# Patient Record
Sex: FEMALE | Race: BLACK OR AFRICAN AMERICAN | Employment: FULL TIME | URBAN - METROPOLITAN AREA
[De-identification: names, ages, dates, MRNs, and addresses within clinical notes are randomized per-mention and may not be internally consistent; named-entity substitution may affect disease eponyms.]

---

## 2017-04-07 LAB
ANTIBODY SCREEN, EXTERNAL: NEGATIVE
CHLAMYDIA, EXTERNAL: NEGATIVE
HBSAG, EXTERNAL: NON REACTIVE
HIV, EXTERNAL: NEGATIVE
N. GONORRHEA, EXTERNAL: NEGATIVE
RUBELLA, EXTERNAL: NORMAL
T. PALLIDUM, EXTERNAL: NEGATIVE
TYPE, ABO & RH, EXTERNAL: NORMAL

## 2017-05-05 LAB
GRBS, EXTERNAL: POSITIVE
URINALYSIS, EXTERNAL: NORMAL

## 2017-11-06 ENCOUNTER — ANESTHESIA EVENT (OUTPATIENT)
Dept: LABOR AND DELIVERY | Age: 32
End: 2017-11-06
Payer: COMMERCIAL

## 2017-11-06 ENCOUNTER — ANESTHESIA (OUTPATIENT)
Dept: LABOR AND DELIVERY | Age: 32
End: 2017-11-06
Payer: COMMERCIAL

## 2017-11-06 ENCOUNTER — HOSPITAL ENCOUNTER (INPATIENT)
Age: 32
LOS: 2 days | Discharge: HOME | End: 2017-11-08
Attending: OBSTETRICS & GYNECOLOGY | Admitting: OBSTETRICS & GYNECOLOGY
Payer: COMMERCIAL

## 2017-11-06 PROBLEM — R10.9 ABDOMINAL PAIN DURING PREGNANCY IN THIRD TRIMESTER: Status: ACTIVE | Noted: 2017-11-06

## 2017-11-06 PROBLEM — O26.893 ABDOMINAL PAIN DURING PREGNANCY IN THIRD TRIMESTER: Status: ACTIVE | Noted: 2017-11-06

## 2017-11-06 LAB
ERYTHROCYTE [DISTWIDTH] IN BLOOD BY AUTOMATED COUNT: 13.9 % (ref 11.5–14.5)
HCT VFR BLD AUTO: 34.2 % (ref 35–47)
HGB BLD-MCNC: 11.7 G/DL (ref 11.5–16)
MCH RBC QN AUTO: 30.5 PG (ref 26–34)
MCHC RBC AUTO-ENTMCNC: 34.2 G/DL (ref 30–36.5)
MCV RBC AUTO: 89.3 FL (ref 80–99)
PLATELET # BLD AUTO: 185 K/UL (ref 150–400)
RBC # BLD AUTO: 3.83 M/UL (ref 3.8–5.2)
WBC # BLD AUTO: 9.8 K/UL (ref 3.6–11)

## 2017-11-06 PROCEDURE — 76060000078 HC EPIDURAL ANESTHESIA

## 2017-11-06 PROCEDURE — 77030031139 HC SUT VCRL2 J&J -A

## 2017-11-06 PROCEDURE — 74011250636 HC RX REV CODE- 250/636: Performed by: ANESTHESIOLOGY

## 2017-11-06 PROCEDURE — 75410000002 HC LABOR FEE PER 1 HR

## 2017-11-06 PROCEDURE — 65410000002 HC RM PRIVATE OB

## 2017-11-06 PROCEDURE — 77030011943

## 2017-11-06 PROCEDURE — 36415 COLL VENOUS BLD VENIPUNCTURE: CPT | Performed by: OBSTETRICS & GYNECOLOGY

## 2017-11-06 PROCEDURE — 74011000250 HC RX REV CODE- 250

## 2017-11-06 PROCEDURE — 77030007880 HC KT SPN EPDRL BBMI -B

## 2017-11-06 PROCEDURE — 75410000000 HC DELIVERY VAGINAL/SINGLE

## 2017-11-06 PROCEDURE — A4300 CATH IMPL VASC ACCESS PORTAL: HCPCS

## 2017-11-06 PROCEDURE — 74011250636 HC RX REV CODE- 250/636

## 2017-11-06 PROCEDURE — 0KQM0ZZ REPAIR PERINEUM MUSCLE, OPEN APPROACH: ICD-10-PCS | Performed by: OBSTETRICS & GYNECOLOGY

## 2017-11-06 PROCEDURE — 74011000258 HC RX REV CODE- 258: Performed by: OBSTETRICS & GYNECOLOGY

## 2017-11-06 PROCEDURE — 85027 COMPLETE CBC AUTOMATED: CPT | Performed by: OBSTETRICS & GYNECOLOGY

## 2017-11-06 PROCEDURE — 75410000003 HC RECOV DEL/VAG/CSECN EA 0.5 HR

## 2017-11-06 PROCEDURE — 99283 EMERGENCY DEPT VISIT LOW MDM: CPT

## 2017-11-06 PROCEDURE — 10907ZC DRAINAGE OF AMNIOTIC FLUID, THERAPEUTIC FROM PRODUCTS OF CONCEPTION, VIA NATURAL OR ARTIFICIAL OPENING: ICD-10-PCS | Performed by: OBSTETRICS & GYNECOLOGY

## 2017-11-06 PROCEDURE — 74011250636 HC RX REV CODE- 250/636: Performed by: OBSTETRICS & GYNECOLOGY

## 2017-11-06 PROCEDURE — 77030014125 HC TY EPDRL BBMI -B: Performed by: ANESTHESIOLOGY

## 2017-11-06 RX ORDER — SODIUM CHLORIDE 0.9 % (FLUSH) 0.9 %
SYRINGE (ML) INJECTION
Status: DISPENSED
Start: 2017-11-06 | End: 2017-11-06

## 2017-11-06 RX ORDER — OXYCODONE AND ACETAMINOPHEN 5; 325 MG/1; MG/1
1 TABLET ORAL
Status: DISCONTINUED | OUTPATIENT
Start: 2017-11-06 | End: 2017-11-08 | Stop reason: HOSPADM

## 2017-11-06 RX ORDER — HYDROCORTISONE ACETATE PRAMOXINE HCL 2.5; 1 G/100G; G/100G
CREAM TOPICAL AS NEEDED
Status: DISCONTINUED | OUTPATIENT
Start: 2017-11-06 | End: 2017-11-08 | Stop reason: HOSPADM

## 2017-11-06 RX ORDER — FENTANYL CITRATE 50 UG/ML
INJECTION, SOLUTION INTRAMUSCULAR; INTRAVENOUS AS NEEDED
Status: DISCONTINUED | OUTPATIENT
Start: 2017-11-06 | End: 2017-11-06 | Stop reason: HOSPADM

## 2017-11-06 RX ORDER — OXYTOCIN/RINGER'S LACTATE 20/1000 ML
125-1000 PLASTIC BAG, INJECTION (ML) INTRAVENOUS AS NEEDED
Status: DISCONTINUED | OUTPATIENT
Start: 2017-11-06 | End: 2017-11-08 | Stop reason: HOSPADM

## 2017-11-06 RX ORDER — SODIUM CHLORIDE, SODIUM LACTATE, POTASSIUM CHLORIDE, CALCIUM CHLORIDE 600; 310; 30; 20 MG/100ML; MG/100ML; MG/100ML; MG/100ML
125 INJECTION, SOLUTION INTRAVENOUS CONTINUOUS
Status: DISCONTINUED | OUTPATIENT
Start: 2017-11-06 | End: 2017-11-08 | Stop reason: HOSPADM

## 2017-11-06 RX ORDER — PENICILLIN G POTASSIUM 5000000 [IU]/1
INJECTION, POWDER, FOR SOLUTION INTRAMUSCULAR; INTRAVENOUS
Status: DISPENSED
Start: 2017-11-06 | End: 2017-11-06

## 2017-11-06 RX ORDER — IBUPROFEN 400 MG/1
800 TABLET ORAL EVERY 8 HOURS
Status: DISCONTINUED | OUTPATIENT
Start: 2017-11-06 | End: 2017-11-08 | Stop reason: HOSPADM

## 2017-11-06 RX ORDER — FENTANYL CITRATE 50 UG/ML
100 INJECTION, SOLUTION INTRAMUSCULAR; INTRAVENOUS
Status: DISCONTINUED | OUTPATIENT
Start: 2017-11-06 | End: 2017-11-06 | Stop reason: HOSPADM

## 2017-11-06 RX ORDER — FENTANYL/BUPIVACAINE/NS/PF 2-1250MCG
1-16 PREFILLED PUMP RESERVOIR EPIDURAL CONTINUOUS
Status: DISCONTINUED | OUTPATIENT
Start: 2017-11-06 | End: 2017-11-08 | Stop reason: HOSPADM

## 2017-11-06 RX ORDER — SODIUM CHLORIDE 0.9 % (FLUSH) 0.9 %
5-10 SYRINGE (ML) INJECTION EVERY 8 HOURS
Status: DISCONTINUED | OUTPATIENT
Start: 2017-11-06 | End: 2017-11-08 | Stop reason: HOSPADM

## 2017-11-06 RX ORDER — DOCUSATE SODIUM 100 MG/1
100 CAPSULE, LIQUID FILLED ORAL
Status: DISCONTINUED | OUTPATIENT
Start: 2017-11-06 | End: 2017-11-08 | Stop reason: HOSPADM

## 2017-11-06 RX ORDER — SODIUM CHLORIDE 0.9 % (FLUSH) 0.9 %
5-10 SYRINGE (ML) INJECTION AS NEEDED
Status: DISCONTINUED | OUTPATIENT
Start: 2017-11-06 | End: 2017-11-08 | Stop reason: HOSPADM

## 2017-11-06 RX ORDER — BUPIVACAINE HYDROCHLORIDE 5 MG/ML
INJECTION, SOLUTION EPIDURAL; INTRACAUDAL AS NEEDED
Status: DISCONTINUED | OUTPATIENT
Start: 2017-11-06 | End: 2017-11-06 | Stop reason: HOSPADM

## 2017-11-06 RX ORDER — HYDROCORTISONE 1 %
CREAM (GRAM) TOPICAL AS NEEDED
Status: DISCONTINUED | OUTPATIENT
Start: 2017-11-06 | End: 2017-11-08 | Stop reason: HOSPADM

## 2017-11-06 RX ORDER — OXYTOCIN IN 5 % DEXTROSE 30/500 ML
PLASTIC BAG, INJECTION (ML) INTRAVENOUS
Status: COMPLETED
Start: 2017-11-06 | End: 2017-11-06

## 2017-11-06 RX ORDER — SIMETHICONE 80 MG
80 TABLET,CHEWABLE ORAL
Status: DISCONTINUED | OUTPATIENT
Start: 2017-11-06 | End: 2017-11-08 | Stop reason: HOSPADM

## 2017-11-06 RX ORDER — OXYTOCIN IN 5 % DEXTROSE 30/500 ML
1-25 PLASTIC BAG, INJECTION (ML) INTRAVENOUS
Status: DISCONTINUED | OUTPATIENT
Start: 2017-11-06 | End: 2017-11-08 | Stop reason: HOSPADM

## 2017-11-06 RX ORDER — SODIUM CHLORIDE 900 MG/100ML
INJECTION INTRAVENOUS
Status: DISPENSED
Start: 2017-11-06 | End: 2017-11-06

## 2017-11-06 RX ORDER — AMMONIA 15 % (W/V)
1 AMPUL (EA) INHALATION AS NEEDED
Status: DISCONTINUED | OUTPATIENT
Start: 2017-11-06 | End: 2017-11-08 | Stop reason: HOSPADM

## 2017-11-06 RX ORDER — ONDANSETRON 4 MG/1
4 TABLET, ORALLY DISINTEGRATING ORAL
Status: DISCONTINUED | OUTPATIENT
Start: 2017-11-06 | End: 2017-11-08 | Stop reason: HOSPADM

## 2017-11-06 RX ORDER — ACETAMINOPHEN 325 MG/1
650 TABLET ORAL
Status: DISCONTINUED | OUTPATIENT
Start: 2017-11-06 | End: 2017-11-08 | Stop reason: HOSPADM

## 2017-11-06 RX ORDER — MINERAL OIL
OIL (ML) ORAL
Status: DISPENSED
Start: 2017-11-06 | End: 2017-11-07

## 2017-11-06 RX ORDER — BUPIVACAINE HYDROCHLORIDE 5 MG/ML
INJECTION, SOLUTION EPIDURAL; INTRACAUDAL
Status: DISPENSED
Start: 2017-11-06 | End: 2017-11-06

## 2017-11-06 RX ORDER — TERBUTALINE SULFATE 1 MG/ML
0.25 INJECTION SUBCUTANEOUS AS NEEDED
Status: DISCONTINUED | OUTPATIENT
Start: 2017-11-06 | End: 2017-11-06 | Stop reason: HOSPADM

## 2017-11-06 RX ORDER — LIDOCAINE HYDROCHLORIDE AND EPINEPHRINE 15; 5 MG/ML; UG/ML
INJECTION, SOLUTION EPIDURAL AS NEEDED
Status: DISCONTINUED | OUTPATIENT
Start: 2017-11-06 | End: 2017-11-06 | Stop reason: HOSPADM

## 2017-11-06 RX ORDER — FENTANYL CITRATE 50 UG/ML
100 INJECTION, SOLUTION INTRAMUSCULAR; INTRAVENOUS ONCE
Status: ACTIVE | OUTPATIENT
Start: 2017-11-06 | End: 2017-11-06

## 2017-11-06 RX ORDER — OXYCODONE AND ACETAMINOPHEN 5; 325 MG/1; MG/1
2 TABLET ORAL
Status: DISCONTINUED | OUTPATIENT
Start: 2017-11-06 | End: 2017-11-08 | Stop reason: HOSPADM

## 2017-11-06 RX ORDER — NALBUPHINE HYDROCHLORIDE 10 MG/ML
10 INJECTION, SOLUTION INTRAMUSCULAR; INTRAVENOUS; SUBCUTANEOUS
Status: DISCONTINUED | OUTPATIENT
Start: 2017-11-06 | End: 2017-11-06 | Stop reason: HOSPADM

## 2017-11-06 RX ORDER — DIPHENHYDRAMINE HCL 25 MG
25 CAPSULE ORAL
Status: DISCONTINUED | OUTPATIENT
Start: 2017-11-06 | End: 2017-11-08 | Stop reason: HOSPADM

## 2017-11-06 RX ORDER — BUPIVACAINE HYDROCHLORIDE 5 MG/ML
30 INJECTION, SOLUTION EPIDURAL; INTRACAUDAL AS NEEDED
Status: DISCONTINUED | OUTPATIENT
Start: 2017-11-06 | End: 2017-11-06 | Stop reason: HOSPADM

## 2017-11-06 RX ORDER — NALOXONE HYDROCHLORIDE 0.4 MG/ML
0.4 INJECTION, SOLUTION INTRAMUSCULAR; INTRAVENOUS; SUBCUTANEOUS AS NEEDED
Status: DISCONTINUED | OUTPATIENT
Start: 2017-11-06 | End: 2017-11-06 | Stop reason: HOSPADM

## 2017-11-06 RX ORDER — FENTANYL CITRATE 50 UG/ML
INJECTION, SOLUTION INTRAMUSCULAR; INTRAVENOUS
Status: COMPLETED
Start: 2017-11-06 | End: 2017-11-06

## 2017-11-06 RX ORDER — FENTANYL/BUPIVACAINE/NS/PF 2-1250MCG
PREFILLED PUMP RESERVOIR EPIDURAL
Status: DISPENSED
Start: 2017-11-06 | End: 2017-11-06

## 2017-11-06 RX ADMIN — FENTANYL 0.2 MG/100ML-BUPIV 0.125%-NACL 0.9% EPIDURAL INJ 10 ML/HR: 2/0.125 SOLUTION at 08:35

## 2017-11-06 RX ADMIN — LIDOCAINE HYDROCHLORIDE AND EPINEPHRINE 5 ML: 15; 5 INJECTION, SOLUTION EPIDURAL at 08:17

## 2017-11-06 RX ADMIN — Medication 10 ML: at 20:30

## 2017-11-06 RX ADMIN — SODIUM CHLORIDE, SODIUM LACTATE, POTASSIUM CHLORIDE, AND CALCIUM CHLORIDE 125 ML/HR: 600; 310; 30; 20 INJECTION, SOLUTION INTRAVENOUS at 09:11

## 2017-11-06 RX ADMIN — PENICILLIN G POTASSIUM 2.5 MILLION UNITS: 20000000 POWDER, FOR SOLUTION INTRAVENOUS at 13:11

## 2017-11-06 RX ADMIN — BUPIVACAINE HYDROCHLORIDE 2 ML: 5 INJECTION, SOLUTION EPIDURAL; INTRACAUDAL at 08:17

## 2017-11-06 RX ADMIN — SODIUM CHLORIDE 5 MILLION UNITS: 900 INJECTION, SOLUTION INTRAVENOUS at 05:24

## 2017-11-06 RX ADMIN — SODIUM CHLORIDE, SODIUM LACTATE, POTASSIUM CHLORIDE, AND CALCIUM CHLORIDE 125 ML/HR: 600; 310; 30; 20 INJECTION, SOLUTION INTRAVENOUS at 05:20

## 2017-11-06 RX ADMIN — Medication 2 MILLI-UNITS/MIN: at 15:05

## 2017-11-06 RX ADMIN — FENTANYL CITRATE 100 MCG: 50 INJECTION, SOLUTION INTRAMUSCULAR; INTRAVENOUS at 08:17

## 2017-11-06 RX ADMIN — FENTANYL 0.2 MG/100ML-BUPIV 0.125%-NACL 0.9% EPIDURAL INJ 10 ML/HR: 2/0.125 SOLUTION at 17:13

## 2017-11-06 RX ADMIN — PENICILLIN G POTASSIUM 2.5 MILLION UNITS: 20000000 POWDER, FOR SOLUTION INTRAVENOUS at 09:11

## 2017-11-06 RX ADMIN — PENICILLIN G POTASSIUM 2.5 MILLION UNITS: 20000000 POWDER, FOR SOLUTION INTRAVENOUS at 17:03

## 2017-11-06 NOTE — PROGRESS NOTES
0745: bedside report from LEEANNA medrano and mar reviewed  4388: patient requesting epidural, dr Ivone Kang in room, bolus started  [phone] 84: straight cath 800 cl/y urine  0925: arom by Dr. Zofia Eckert clear fluid, 7/90/-1 vertex  1119: straight cath 400 cl/y  1134: bedside report to LIANG medrano and mar reviewed

## 2017-11-06 NOTE — IP AVS SNAPSHOT
[address] 
[phone] Patient: Jean-Paul Montes MRN: [id number] BTQ:0/1/2438 About your hospitalization You were admitted on:  November 6, 2017 You last received care in the:  [address] [facility] You were discharged on:  November 8, 2017 Why you were hospitalized Your primary diagnosis was:  Not on File Your diagnoses also included:  Abdominal Pain During Pregnancy In Third Trimester, Normal Delivery Things You Need To Do (next 8 weeks) Call [facility] today As needed with breastfeeding questions Phone:  [phone] Where:  [facility] [employer] [address], [address], Nadeen Lazo [facility] Schedule an appointment with Tori Nelson MD as soon as possible for a visit in 6 week(s) Postpartum Follow-Up Phone:  [phone] Where:  [address] [place] [address], Suite [facility], [address] [place] [address] Discharge Orders None A check yasmeen indicates which time of day the medication should be taken. My Medications TAKE these medications as instructed Instructions Each Dose to Equal  
 Morning Noon Evening Bedtime  
 ibuprofen 600 mg tablet Commonly known as:  MOTRIN Your last dose was: Your next dose is: Take 1 Tab by mouth every six (6) hours as needed for Pain. 600 mg  
    
   
   
   
  
 oxyCODONE-acetaminophen 5-325 mg per tablet Commonly known as:  PERCOCET Your last dose was: Your next dose is: Take 1 Tab by mouth every six (6) hours as needed. Max Daily Amount: 4 Tabs. 1 Tab [id number] fum-folic acid-dss 29 mg iron- 1 mg-25 mg Tab Your last dose was: Your next dose is: Take 1 Tab by mouth daily. Indications: Pregnancy 1 Tab Where to Get Your Medications Information on where to get these meds will be given to you by the nurse or doctor. ! Ask your nurse or doctor about these medications  
  ibuprofen 600 mg tablet  
 oxyCODONE-acetaminophen 5-325 mg per tablet Discharge Instructions POSTPARTUM DISCHARGE INSTRUCTIONS Name:  Yessenia Remy YOB: 1985 Admission Diagnosis:  Abdominal pain during pregnancy in third trimester Discharge Diagnosis:   
Problem List as of 11/8/2017  Never Reviewed Codes Class Noted - Resolved Normal delivery ICD-10-CM: O80, Z37.9 ICD-9-CM: 687  11/7/2017 - Present RESOLVED: Abdominal pain during pregnancy in third trimester ICD-10-CM: O26.893, R10.9 ICD-9-CM: 983.44, 789.00  11/6/2017 - 11/7/2017 Attending Physician:  Gil Cheng MD 
 
Delivery Type:  Vaginal Childbirth: What To Expect At Home Your Recovery: Your body will slowly heal in the next few weeks. It is easy to get too tired and overwhelmed during the first weeks after your baby is born. Changes in your hormones can shift your mood without warning. You may find it hard to meet the extra demands on your energy and time. Take it easy on yourself. Follow-up care is a key part of your treatment and safety. Be sure to make and go to all appointments, and call your doctor if you are having problems. It's also a good idea to know your test results and keep a list of the medicines you take. How can you care for yourself at home? Vaginal bleeding and cramps · After delivery, you will have a bloody discharge from the vagina. This will turn pink within a week and then white or yellow after about 10 days. It may last for 2 to 4 weeks or longer, until the uterus has healed. Use pads instead of tampons until you stop bleeding. · Do not worry if you pass some blood clots, as long as they are smaller than a golf ball. If you have a tear or stitches in your vaginal area, change the pad at least every 4 hours to prevent soreness and infection. · You may have cramps for the first few days after childbirth. These are normal and occur as the uterus shrinks to normal size. Take an over-the-counter pain medicine, such as acetaminophen (Tylenol), ibuprofen (Advil, Motrin), or naproxen (Aleve), for cramps. Read and follow all instructions on the label. Do not take aspirin, because it can cause more bleeding. Do not take acetaminophen (Tylenol) and other acetaminophen containing medications (i.e. Percocet) at the same time. Breast fullness · Your breasts may overfill (engorge) in the first few days after delivery. To help milk flow and to relieve pain, warm your breasts in the shower or by using warm, moist towels before nursing. · If you are not nursing, do not put warmth on your breasts or touch your breasts. Wear a tight bra or sports bra and use ice until the fullness goes away. This usually takes 2 to 3 days. · Put ice or a cold pack on your breast after nursing to reduce swelling and pain. Put a thin cloth between the ice and your skin. Activity · Eat a balanced diet. Do not try to lose weight by cutting calories. Keep taking your prenatal vitamins, or take a multivitamin. · Get as much rest as you can. Try to take naps when your baby sleeps during the day. · Get some exercise every day. But do not do any heavy exercise until your doctor says it is okay. · Wait until you are healed (about 4 to 6 weeks) before you have sexual intercourse. Your doctor will tell you when it is okay to have sex. · Talk to your doctor about birth control. You can get pregnant even before your period returns. Also, you can get pregnant while you are breast-feeding. Mental Health · Many women get the \"baby blues\" during the first few days after childbirth. You may lose sleep, feel irritable, and cry easily. You may feel happy one minute and sad the next. Hormone changes are one cause of these emotional changes.  Also, the demands of a new baby, along with visits from relatives or other family needs, add to a mother's stress. The \"baby blues\" often peak around the fourth day. Then they ease up in less than 2 weeks. · If your moodiness or anxiety lasts for more than 2 weeks, or if you feel like life is not worth living, you may have postpartum depression. This is different for each mother. Some mothers with serious depression may worry intensely about their infant's well-being. Others may feel distant from their child. Some mothers might even feel that they might harm their baby. A mother may have signs of paranoia, wondering if someone is watching her. · With all the changes in your life, you may not know if you are depressed. Pregnancy sometimes causes changes in how you feel that are similar to the symptoms of depression. · Symptoms of depression include: · Feeling sad or hopeless and losing interest in daily activities. These are the most common symptoms of depression. · Sleeping too much or not enough. · Feeling tired. You may feel as if you have no energy. · Eating too much or too little. · POSTPARTUM SUPPORT INTERNATIONAL (PSI) offers a Warm line; Chat with the Expert phone sessions; Information and Articles about Pregnancy and Postpartum Mood Disorders; Comprehensive List of Free Support Groups; Knowledgeable local coordinators who will offer support, information, and resources; Guide to Resources on [employer]; Calendar of events in the  mood disorders community; Latest News and Research; and [facility] [address] for [employer]. Remember - You are not alone; You are not to blame; With help, you will be well. 8-665-928-PPD(0310). WWW. POSTPARTUM. NET · Writing or talking about death, such as writing suicide notes or talking about guns, knives, or pills. Keep the numbers for these national suicide hotlines: 6-250-504-TALK (9-630.111.8169) and 0-348-MLQDWUP (3-384.933.1402).  If you or someone you know talks about suicide or feeling hopeless, get help right away. Constipation and Hemorrhoids · Drink plenty of fluids, enough so that your urine is light yellow or clear like water. If you have kidney, heart, or liver disease and have to limit fluids, talk with your doctor before you increase the amount of fluids you drink. · Eat plenty of fiber each day. Have a bran muffin or bran cereal for breakfast, and try eating a piece of fruit for a mid-afternoon snack. · For painful, itchy hemorrhoids, put ice or a cold pack on the area several times a day for 10 minutes at a time. Follow this by putting a warm compress on the area for another 10 to 20 minutes or by sitting in a shallow, warm bath. When should you call for help? Call 911 anytime you think you may need emergency care. For example, call if: 
· You are thinking of hurting yourself, your baby, or anyone else. · You passed out (lost consciousness). · You have symptoms of a blood clot in your lung (called a pulmonary embolism). These may include:   
· Sudden chest pain. · Trouble breathing. · Coughing up blood. Call your doctor now or seek immediate medical care if: 
· You have severe vaginal bleeding. · You are soaking through a pad each hour for 2 or more hours. · Your vaginal bleeding seems to be getting heavier or is still bright red 4 days after delivery. · You are dizzy or lightheaded, or you feel like you may faint. · You are vomiting or cannot keep fluids down. · You have a fever. · You have new or more belly pain. · You pass tissue (not just blood). · Your vaginal discharge smells bad. · Your belly feels tender or full and hard. · Your breasts are continuously painful or red. · You feel sad, anxious, or hopeless for more than a few days. · You have sudden, severe pain in your belly. · You have symptoms of a blood clot in your leg (called a deep vein thrombosis),  
       such as: 
· Pain in your calf, back of the knee, thigh, or groin. · Redness and swelling in your leg or groin. · You have symptoms of preeclampsia, such as: 
· Sudden swelling of your face, hands, or feet. · New vision problems (such as dimness or blurring). · A severe headache. · Your blood pressure is higher than it should be or rises suddenly. · You have new nausea or vomiting. Watch closely for changes in your health, and be sure to contact your doctor if you have any problems. Additional Information:  Postpartum Support PARENTS:  Are you feeling sad or depressed? Is it difficult for you to enjoy yourself? Do you feel more irritable or tense? Do you feel anxious or panicky? Are you having difficulty bonding with your baby? Do you feel as if you are \"out of control\" or \"going crazy\"? Are you worried that you might hurt your baby or yourself? FAMILIES: Do you worry that something is wrong but don't know how to help? Do you think that your partner or spouse is having problems coping? Are you worried that it may never get better? While many women experience some mild mood change or \"the blues\" during or after the birth of a child, 1 in 9 women experience more significant symptoms of depression or anxiety. 1 in 10 Dads become depressed during the first year. Things you can do Being a good parent includes taking care of yourself. If you take care of yourself, you will be able to take better care of your baby and your family. · Talk to a counselor or healthcare provider who has training in  mood and anxiety problems. · Learn as much as you can about pregnancy and postpartum depression and anxiety. · Get support from family and friends. Ask for help when you need it. · Join a support group in your area or online. · Keep active by walking, stretching or whatever form of exercise helps you to feel better. · Get enough rest and time for yourself. · Eat a healthy diet. · Don't give up! It may take more than one try to get the right help you need. These are general instructions for a healthy lifestyle: No smoking/ No tobacco products/ Avoid exposure to second hand smoke Surgeon General's Warning:  Quitting smoking now greatly reduces serious risk to your health. Obesity, smoking, and sedentary lifestyle greatly increases your risk for illness A healthy diet, regular physical exercise & weight monitoring are important for maintaining a healthy lifestyle Recognize signs and symptoms of STROKE: 
 
F-face looks uneven A-arms unable to move or move unevenly S-speech slurred or non-existent T-time-call 911 as soon as signs and symptoms begin - DO NOT go  
    back to bed or wait to see if you get better - TIME IS BRAIN. I have had the opportunity to make my options or choices for discharge. I have received and understand these instructions. [employer] Announcement We are excited to announce that we are making your provider's discharge notes available to you in [employer]. You will see these notes when they are completed and signed by the physician that discharged you from your recent hospital stay. If you have any questions or concerns about any information you see in [employer], please call the Health Information Department where you were seen or reach out to your Primary Care Provider for more information about your plan of care. Introducing [facility]! Pooja Guillen introduces [employer] patient portal. Now you can access parts of your medical record, email your doctor's office, and request medication refills online. 1. In your internet browser, go to [email or web address] 2. Click on the First Time User? Click Here link in the Sign In box. You will see the New Member Sign Up page. 3. Enter your [employer] Access Code exactly as it appears below.  You will not need to use this code after youve completed the sign-up process. If you do not sign up before the expiration date, you must request a new code. · [employer]ric[employer] Access Code: [email or web address] Expires: 2/6/2018 12:05 PM 
 
4. Enter the last four digits of your Social Security Number (xxxx) and Date of Birth (mm/dd/yyyy) as indicated and click Submit. You will be taken to the next sign-up page. 5. Create a Softricity ID. This will be your Softricity login ID and cannot be changed, so think of one that is secure and easy to remember. 6. Create a [employer]ricity password. You can change your password at any time. 7. Enter your Password Reset Question and Answer. This can be used at a later time if you forget your password. 8. Enter your e-mail address. You will receive e-mail notification when new information is available in [address] 9. Click Sign Up. You can now view and download portions of your medical record. 10. Click the Download Summary menu link to download a portable copy of your medical information. If you have questions, please visit the Frequently Asked Questions section of the [employer]ric[employer] website. Remember, [employer]ity is NOT to be used for urgent needs. For medical emergencies, dial 911. Now available from your iPhone and Android! Providers Seen During Your Hospitalization Provider Specialty Primary office phone Valente Vera MD Obstetrics & Gynecology [phone] Your Primary Care Physician (PCP) Primary Care Physician Office Phone Office Fax NONE ** None ** ** None ** You are allergic to the following No active allergies Recent Documentation Height Weight Breastfeeding? BMI OB Status Smoking Status 1.651 m 98.9 kg Unknown 36.28 kg/m2 Recent pregnancy Never Smoker Emergency Contacts Name Discharge Info Relation Home Work Mobile [address] CAREGIVER [3] Spouse [3] [phone] Patient Belongings The following personal items are in your possession at time of discharge: 
  Dental Appliances: None  Visual Aid: None      Home Medications: None   Jewelry: Ring, Necklace  Clothing: At bedside, Undergarments, Footwear, Shirt, Pants    Other Valuables: Cell Phone, At bedside Please provide this summary of care documentation to your next provider. Signatures-by signing, you are acknowledging that this After Visit Summary has been reviewed with you and you have received a copy. Patient Signature:  ____________________________________________________________ Date:  ____________________________________________________________  
  
[facility] Margret Provider Signature:  ____________________________________________________________ Date:  ____________________________________________________________

## 2017-11-06 NOTE — PROGRESS NOTES
1130: Bedside and Verbal shift change report given to LIANG Wilson (oncoming nurse) by MACARIO Knox (offgoing nurse). Report included the following information SBAR, Procedure Summary, Intake/Output, MAR and Recent Results. 1440: Dr. Benito Rendon in to see patient. SVE unchanged. IUPC placed, may start Pitocin if contractions are inadequate. 1730: Dr. Benito Rendon in to see patient. Plans to recheck cervix at 1800.     1846:     1930: Bedside and Verbal shift change report given to LEEANNA Cevallos(oncoming nurse) by LIANG Marinelli (offgoing nurse). Report included the following information SBAR, Procedure Summary, Intake/Output, MAR and Recent Results.
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
## 2017-11-06 NOTE — PROGRESS NOTES
Labor Progress Note  Patient seen, fetal heart rate and contraction pattern evaluated, patient examined. Patient Vitals for the past 1 hrs:   BP Pulse SpO2   17 0849 119/78 (!) 116 98 %   17 0845 114/76 99 -   17 0840 122/85 89 99 %   17 0835 111/55 80 -   17 0830 111/50 73 -   Subjective:  Patient is comfortable with her epidural.      Physical Exam:  Visit Vitals    /78    Pulse (!) 116    Temp 98.2 °F (36.8 °C)    Resp 16    Ht 5' 5\" (1.651 m)    Wt 98.9 kg (218 lb)    SpO2 98%    Breastfeeding No    BMI 36.28 kg/m2     Resting comfortably  No resp distress  abd soft, gravid, firm with contractions    Cervical Exam:7cm/90%/-2, came down to -1 station with AROM  Membranes:  Amniotomy performed with return of clear fluid, FHTs reassuring throughout  Uterine Activity: every 5 minutes  Fetal Heart Rate: Reactive    Assessment/Plan:  31yo  @ 40w3d in term labor.   GBS positive, s/p 2 doses PCN  Continue active management; anticipate vaginal delivery

## 2017-11-06 NOTE — IP AVS SNAPSHOT
[address] 
[phone] Patient: Manoj Nguyen MRN: [id number] NFB:[birth year] My Medications TAKE these medications as instructed Instructions Each Dose to Equal  
 Morning Noon Evening Bedtime  
 ibuprofen 600 mg tablet Commonly known as:  MOTRIN Your last dose was: Your next dose is: Take 1 Tab by mouth every six (6) hours as needed for Pain. 600 mg  
    
   
   
   
  
 oxyCODONE-acetaminophen 5-325 mg per tablet Commonly known as:  PERCOCET Your last dose was: Your next dose is: Take 1 Tab by mouth every six (6) hours as needed. Max Daily Amount: 4 Tabs. 1 Tab [id number] fum-folic acid-dss 29 mg iron- 1 mg-25 mg Tab Your last dose was: Your next dose is: Take 1 Tab by mouth daily. Indications: Pregnancy 1 Tab Where to Get Your Medications Information on where to get these meds will be given to you by the nurse or doctor. ! Ask your nurse or doctor about these medications  
  ibuprofen 600 mg tablet  
 oxyCODONE-acetaminophen 5-325 mg per tablet

## 2017-11-06 NOTE — ANESTHESIA PROCEDURE NOTES
Epidural Block    Start time: 11/6/2017 8:05 AM  End time: 11/6/2017 8:20 AM  Performed by: ELLA Candelaria  Authorized by: ELLA Candelaria     Pre-Procedure  Indication: labor epidural    Preanesthetic Checklist: patient identified, risks and benefits discussed, anesthesia consent, site marked, patient being monitored, timeout performed and anesthesia consent    Timeout Time: 08:05        Epidural:   Patient position:  Left lateral decubitus  Prep region:  Lumbar  Prep: Betadine    Location:  L2-3    Needle and Epidural Catheter:   Needle Type:  Tuohy  Needle Gauge:  17 G  Injection Technique:  Loss of resistance using air  Attempts:  1  Catheter Size:  19 G  Events: no blood with aspiration, no cerebrospinal fluid with aspiration, no paresthesia and negative aspiration test    Test Dose:  Negative and lidocaine 1.5% w/ epi    Assessment:   Catheter Secured:  Tegaderm and tape  Insertion:  Uncomplicated  Patient tolerance:  Patient tolerated the procedure well with no immediate complications

## 2017-11-06 NOTE — PROGRESS NOTES
Cervix unchanged after over 4 hours  /-1 OA  IUPC placed - recommend pitocin augmentation if contractions are inadequate. If contractions are adequate, recommend  section.   Patient expressed understanding and is in agreement

## 2017-11-07 PROBLEM — O26.893 ABDOMINAL PAIN DURING PREGNANCY IN THIRD TRIMESTER: Status: RESOLVED | Noted: 2017-11-06 | Resolved: 2017-11-07

## 2017-11-07 PROBLEM — R10.9 ABDOMINAL PAIN DURING PREGNANCY IN THIRD TRIMESTER: Status: RESOLVED | Noted: 2017-11-06 | Resolved: 2017-11-07

## 2017-11-07 PROCEDURE — 74011250637 HC RX REV CODE- 250/637: Performed by: OBSTETRICS & GYNECOLOGY

## 2017-11-07 PROCEDURE — 65410000002 HC RM PRIVATE OB

## 2017-11-07 RX ADMIN — IBUPROFEN 800 MG: 400 TABLET ORAL at 00:45

## 2017-11-07 RX ADMIN — IBUPROFEN 800 MG: 400 TABLET ORAL at 17:04

## 2017-11-07 RX ADMIN — IBUPROFEN 800 MG: 400 TABLET ORAL at 08:50

## 2017-11-07 NOTE — ANESTHESIA POSTPROCEDURE EVALUATION
Post-Anesthesia Evaluation and Assessment    Patient: Garret Meraz MRN: [id number]  SSN: [id number]    YOB: 1985  Age: 29 y.o. Sex: female       Cardiovascular Function/Vital Signs  Visit Vitals    /66    Pulse (!) 101    Temp 37.3 °C (99.1 °F)    Resp 16    Ht 5' 5\" (1.651 m)    Wt 98.9 kg (218 lb)    SpO2 99%    Breastfeeding No    BMI 36.28 kg/m2       Patient is status post epidural anesthesia for * No procedures listed *. Nausea/Vomiting: None    Postoperative hydration reviewed and adequate. Pain:  Pain Scale 1: Labor Algorithm/Pain Intensity (11/06/17 1600)  Pain Intensity 1: 0 (11/06/17 0407)   Managed    Neurological Status:   Neuro (WDL): Within Defined Limits (11/06/17 1200)   At baseline    Mental Status and Level of Consciousness: Arousable    Pulmonary Status:   O2 Device: Room air (11/06/17 1600)   Adequate oxygenation and airway patent    Complications related to anesthesia: None    Post-anesthesia assessment completed.  No concerns    Signed By: Geoffrey Quan MD     November 6, 2017

## 2017-11-07 NOTE — LACTATION NOTE
This note was copied from a baby's chart. Initial Lactation Consultation: Infant born yesterday evening vaginally to a  mom at 36 1/7 weeks gestation. Infant has been feeding well during the night and morning hours. Infant obtains deep latch with rhythmic sucking and audible swallowing. Instructed mom in breast massage while nursing to facilitate lactogenesis. Feeding Plan: Mother will keep baby skin to skin as often as possible, feed on demand, 8-12x/day , respond to feeding cues, obtain latch, listen for audible swallowing, be aware of signs of oxytocin release/ cramping,thirst,sleepiness while breastfeeding, offer both breasts,and will not limit feedings. Mother agrees to utilize breast massage while nursing to facilitate lactogenesis. Opportunity for questions provided. Mom to call for assistance as needed.

## 2017-11-07 NOTE — ROUTINE PROCESS
TRANSFER - IN REPORT:    Verbal report received from LEEANNA Stevenson RN on [employer]  being received from L&D(unit) for routine progression of care      Report consisted of patients Situation, Background, Assessment and   Recommendations(SBAR). Information from the following report(s) SBAR was reviewed with the receiving nurse. Opportunity for questions and clarification was provided. Assessment completed upon patients arrival to unit and care assumed.

## 2017-11-07 NOTE — PROGRESS NOTES
2250- Assisted pt to bathroom. Gait steady, no complaints. Sallie care education completed. DTV completed. Pt completed self care. CV by 0500.

## 2017-11-07 NOTE — L&D DELIVERY NOTE
Delivery Summary  Patient: Krissy Wall             Circumcision:   NA-female  Additional Delivery Comments - Uncomplicated TSVD.  vigorous and crying immediately after delivery, and placed directly on maternal abdomen. Delayed cord clamping performed, then cut by dad. Placenta delivered intact. Second degree perineal laceration repaired in a running fashion using 3-0 rapid. Fundus was firm and bleeding appropriate.       Cord pH:  none    Episiotomy: None   Laceration(s): Second degree perineal     Estimated Blood Loss (ml): 250    Labor Events  Method: None      Augmentation: AROM   Cervical Ripening:       None        Hospital Problems  Never Reviewed          Codes Class Noted POA    Abdominal pain during pregnancy in third trimester ICD-10-CM: O26.893, R10.9  ICD-9-CM: 646.83, 789.00  2017 Unknown            Operative Vaginal Delivery - none    Group B Strep:   Lab Results   Component Value Date/Time    GrBStrep, External positive 2017     Information for the patient's :  Tremayne Hood [[id number]]   No results found for: ABORH, PCTABR, PCTDIG, BILI, ABORHEXT, ABORH    No results found for: APH, APCO2, APO2, AHCO3, ABEC, ABDC, O2ST, EPHV, PCO2V, PO2V, HCO3V, EBEV, EBDV, SITE, RSCOM

## 2017-11-07 NOTE — PROGRESS NOTES
1930 Bedside and Verbal shift change report given to Herlinda Vicente RN (oncoming nurse) by Anuel Jackson RN (offgoing nurse). Report included the following information SBAR, Kardex, Procedure Summary, Intake/Output, MAR, Accordion, Recent Results and Med Rec Status. 2045 Epidural catheter removed. Patient bathed and gown changed. 2145 TRANSFER - OUT REPORT:    Verbal report given to [address] [place] [address] RN(name) on Sj Domínguez  being transferred to [facility](unit) for routine progression of care       Report consisted of patients Situation, Background, Assessment and   Recommendations(SBAR). Information from the following report(s) SBAR, Kardex, Procedure Summary, Intake/Output, MAR, Accordion, Recent Results and Med Rec Status was reviewed with the receiving nurse. Lines:   Peripheral IV 11/06/17 Left; Lower Arm (Active)   Site Assessment Clean, dry, & intact 11/6/2017  7:37 PM   Phlebitis Assessment 0 11/6/2017  7:37 PM   Infiltration Assessment 0 11/6/2017  7:37 PM   Dressing Status Clean, dry, & intact 11/6/2017  7:37 PM   Dressing Type Transparent 11/6/2017  7:37 PM   Hub Color/Line Status Pink; Infusing 11/6/2017  7:37 PM        Opportunity for questions and clarification was provided.       Patient transported with:   Registered Nurse

## 2017-11-07 NOTE — PROGRESS NOTES
Post-Partum Day Number 1 Progress Note    Rigo Wick     Assessment: Active Problems:    Normal delivery (2017)      Doing well, post partum day 1    Plan:  1. Continue routine postpartum and perineal care as well as maternal education. 2. N/A     Information for the patient's :  Allison Miranda [[id number]]   Vaginal, Spontaneous Delivery   Patient doing well without significant complaint. Voiding without difficulty, normal lochia. Current Facility-Administered Medications   Medication Dose Route Frequency    lactated Ringers infusion  125 mL/hr IntraVENous CONTINUOUS    fentaNYL 2mcg/mL - bupivacaine 0.125% pf epidural  1-16 mL/hr Epidural CONTINUOUS    oxytocin (PITOCIN) 30 units/500 mL D5W  1-25 roselyn-units/min IntraVENous TITRATE    sodium chloride (NS) flush 5-10 mL  5-10 mL IntraVENous Q8H    Rho D immune globulin (RHOGAM) 1,500 unit (300 mcg) injection 0.3 mg  300 mcg IntraMUSCular ONCE    ibuprofen (MOTRIN) tablet 800 mg  800 mg Oral Q8H       Vitals:  Visit Vitals    /75 (BP 1 Location: Right arm, BP Patient Position: At rest)    Pulse 86    Temp 98.2 °F (36.8 °C)    Resp 14    Ht 5' 5\" (1.651 m)    Wt 98.9 kg (218 lb)    SpO2 99%    Breastfeeding Unknown    BMI 36.28 kg/m2     Temp (24hrs), Av °F (37.2 °C), Min:98.2 °F (36.8 °C), Max:100 °F (37.8 °C)        Exam:   Patient without distress. Abdomen soft, fundus firm, nontender                Perineum with normal lochia noted. Lower extremities are negative for swelling, cords or tenderness. Labs:     Lab Results   Component Value Date/Time    WBC 9.8 2017 05:16 AM    HGB 11.7 2017 05:16 AM    HCT 34.2 2017 05:16 AM    PLATELET 421 9882 05:16 AM       No results found for this or any previous visit (from the past 24 hour(s)).

## 2017-11-08 VITALS
OXYGEN SATURATION: 99 % | DIASTOLIC BLOOD PRESSURE: 64 MMHG | BODY MASS INDEX: 36.32 KG/M2 | RESPIRATION RATE: 16 BRPM | TEMPERATURE: 98.1 F | SYSTOLIC BLOOD PRESSURE: 102 MMHG | WEIGHT: 218 LBS | HEART RATE: 84 BPM | HEIGHT: 65 IN

## 2017-11-08 PROCEDURE — 74011250637 HC RX REV CODE- 250/637: Performed by: OBSTETRICS & GYNECOLOGY

## 2017-11-08 RX ORDER — OXYCODONE AND ACETAMINOPHEN 5; 325 MG/1; MG/1
1 TABLET ORAL
Qty: 30 TAB | Refills: 0 | Status: ON HOLD | OUTPATIENT
Start: 2017-11-08 | End: 2021-02-15 | Stop reason: ALTCHOICE

## 2017-11-08 RX ORDER — IBUPROFEN 600 MG/1
600 TABLET ORAL
Qty: 30 TAB | Refills: 1 | Status: ON HOLD | OUTPATIENT
Start: 2017-11-08 | End: 2021-02-15 | Stop reason: ALTCHOICE

## 2017-11-08 RX ADMIN — IBUPROFEN 800 MG: 400 TABLET ORAL at 01:42

## 2017-11-08 RX ADMIN — IBUPROFEN 800 MG: 400 TABLET ORAL at 11:54

## 2017-11-08 NOTE — DISCHARGE INSTRUCTIONS
POSTPARTUM DISCHARGE INSTRUCTIONS       Name:  Melly Pandya  YOB: 1985  Admission Diagnosis:  Abdominal pain during pregnancy in third trimester     Discharge Diagnosis:    Problem List as of 11/8/2017  Never Reviewed          Codes Class Noted - Resolved    Normal delivery ICD-10-CM: O80, Z37.9  ICD-9-CM: 514  11/7/2017 - Present        RESOLVED: Abdominal pain during pregnancy in third trimester ICD-10-CM: O26.893, R10.9  ICD-9-CM: 646.83, 789.00  11/6/2017 - 11/7/2017            Attending Physician:  Allen Velez MD    Delivery Type:  Vaginal Childbirth: What To Expect At Home    Your Recovery: Your body will slowly heal in the next few weeks. It is easy to get too tired and overwhelmed during the first weeks after your baby is born. Changes in your hormones can shift your mood without warning. You may find it hard to meet the extra demands on your energy and time. Take it easy on yourself. Follow-up care is a key part of your treatment and safety. Be sure to make and go to all appointments, and call your doctor if you are having problems. It's also a good idea to know your test results and keep a list of the medicines you take. How can you care for yourself at home? Vaginal bleeding and cramps  · After delivery, you will have a bloody discharge from the vagina. This will turn pink within a week and then white or yellow after about 10 days. It may last for 2 to 4 weeks or longer, until the uterus has healed. Use pads instead of tampons until you stop bleeding. · Do not worry if you pass some blood clots, as long as they are smaller than a golf ball. If you have a tear or stitches in your vaginal area, change the pad at least every 4 hours to prevent soreness and infection. · You may have cramps for the first few days after childbirth. These are normal and occur as the uterus shrinks to normal size.  Take an over-the-counter pain medicine, such as acetaminophen (Tylenol), ibuprofen (Advil, Motrin), or naproxen (Aleve), for cramps. Read and follow all instructions on the label. Do not take aspirin, because it can cause more bleeding. Do not take acetaminophen (Tylenol) and other acetaminophen containing medications (i.e. Percocet) at the same time. Breast fullness  · Your breasts may overfill (engorge) in the first few days after delivery. To help milk flow and to relieve pain, warm your breasts in the shower or by using warm, moist towels before nursing. · If you are not nursing, do not put warmth on your breasts or touch your breasts. Wear a tight bra or sports bra and use ice until the fullness goes away. This usually takes 2 to 3 days. · Put ice or a cold pack on your breast after nursing to reduce swelling and pain. Put a thin cloth between the ice and your skin. Activity  · Eat a balanced diet. Do not try to lose weight by cutting calories. Keep taking your prenatal vitamins, or take a multivitamin. · Get as much rest as you can. Try to take naps when your baby sleeps during the day. · Get some exercise every day. But do not do any heavy exercise until your doctor says it is okay. · Wait until you are healed (about 4 to 6 weeks) before you have sexual intercourse. Your doctor will tell you when it is okay to have sex. · Talk to your doctor about birth control. You can get pregnant even before your period returns. Also, you can get pregnant while you are breast-feeding. Mental Health  · Many women get the \"baby blues\" during the first few days after childbirth. You may lose sleep, feel irritable, and cry easily. You may feel happy one minute and sad the next. Hormone changes are one cause of these emotional changes. Also, the demands of a new baby, along with visits from relatives or other family needs, add to a mother's stress. The \"baby blues\" often peak around the fourth day. Then they ease up in less than 2 weeks.   · If your moodiness or anxiety lasts for more than 2 weeks, or if you feel like life is not worth living, you may have postpartum depression. This is different for each mother. Some mothers with serious depression may worry intensely about their infant's well-being. Others may feel distant from their child. Some mothers might even feel that they might harm their baby. A mother may have signs of paranoia, wondering if someone is watching her. · With all the changes in your life, you may not know if you are depressed. Pregnancy sometimes causes changes in how you feel that are similar to the symptoms of depression. · Symptoms of depression include:  · Feeling sad or hopeless and losing interest in daily activities. These are the most common symptoms of depression. · Sleeping too much or not enough. · Feeling tired. You may feel as if you have no energy. · Eating too much or too little. · [employer] (PSI) offers a Warm line; Chat with the Expert phone sessions; Information and Articles about Pregnancy and Postpartum Mood Disorders; Comprehensive List of Free Support Groups; Knowledgeable local coordinators who will offer support, information, and resources; Guide to Resources on He[employer]; Calendar of events in the  mood disorders community; Latest News and Research; and [facility] [address] for [employer]. Remember - You are not alone; You are not to blame; With help, you will be well. 9-509-218-PPD(3899). WWW. POSTPARTUM. NET   · Writing or talking about death, such as writing suicide notes or talking about guns, knives, or pills. Keep the numbers for these national suicide hotlines: 2-862-572-TALK (4-161.280.3170) and 6-692-CEIIFOE (2-679.163.5363). If you or someone you know talks about suicide or feeling hopeless, get help right away. Constipation and Hemorrhoids  · Drink plenty of fluids, enough so that your urine is light yellow or clear like water.  If you have kidney, heart, or liver disease and have to limit fluids, talk with your doctor before you increase the amount of fluids you drink. · Eat plenty of fiber each day. Have a bran muffin or bran cereal for breakfast, and try eating a piece of fruit for a mid-afternoon snack. · For painful, itchy hemorrhoids, put ice or a cold pack on the area several times a day for 10 minutes at a time. Follow this by putting a warm compress on the area for another 10 to 20 minutes or by sitting in a shallow, warm bath. When should you call for help? Call 911 anytime you think you may need emergency care. For example, call if:  · You are thinking of hurting yourself, your baby, or anyone else. · You passed out (lost consciousness). · You have symptoms of a blood clot in your lung (called a pulmonary embolism). These may include:    · Sudden chest pain. · Trouble breathing. · Coughing up blood. Call your doctor now or seek immediate medical care if:  · You have severe vaginal bleeding. · You are soaking through a pad each hour for 2 or more hours. · Your vaginal bleeding seems to be getting heavier or is still bright red 4 days after delivery. · You are dizzy or lightheaded, or you feel like you may faint. · You are vomiting or cannot keep fluids down. · You have a fever. · You have new or more belly pain. · You pass tissue (not just blood). · Your vaginal discharge smells bad. · Your belly feels tender or full and hard. · Your breasts are continuously painful or red. · You feel sad, anxious, or hopeless for more than a few days. · You have sudden, severe pain in your belly. · You have symptoms of a blood clot in your leg (called a deep vein thrombosis),          such as:  · Pain in your calf, back of the knee, thigh, or groin. · Redness and swelling in your leg or groin. · You have symptoms of preeclampsia, such as:  · Sudden swelling of your face, hands, or feet. · New vision problems (such as dimness or blurring). · A severe headache.   · Your blood pressure is higher than it should be or rises suddenly. · You have new nausea or vomiting. Watch closely for changes in your health, and be sure to contact your doctor if you have any problems. Additional Information:  Postpartum Support    PARENTS:  Are you feeling sad or depressed? Is it difficult for you to enjoy yourself? Do you feel more irritable or tense? Do you feel anxious or panicky? Are you having difficulty bonding with your baby? Do you feel as if you are \"out of control\" or \"going crazy\"? Are you worried that you might hurt your baby or yourself? FAMILIES: Do you worry that something is wrong but don't know how to help? Do you think that your partner or spouse is having problems coping? Are you worried that it may never get better? While many women experience some mild mood change or \"the blues\" during or after the birth of a child, 1 in 9 women experience more significant symptoms of depression or anxiety. 1 in 10 Dads become depressed during the first year. Things you can do  Being a good parent includes taking care of yourself. If you take care of yourself, you will be able to take better care of your baby and your family. · Talk to a counselor or healthcare provider who has training in  mood and anxiety problems. · Learn as much as you can about pregnancy and postpartum depression and anxiety. · Get support from family and friends. Ask for help when you need it. · Join a support group in your area or online. · Keep active by walking, stretching or whatever form of exercise helps you to feel better. · Get enough rest and time for yourself. · Eat a healthy diet. · Don't give up! It may take more than one try to get the right help you need.        These are general instructions for a healthy lifestyle:    No smoking/ No tobacco products/ Avoid exposure to second hand smoke    Surgeon General's Warning:  Quitting smoking now greatly reduces serious risk to your health. Obesity, smoking, and sedentary lifestyle greatly increases your risk for illness    A healthy diet, regular physical exercise & weight monitoring are important for maintaining a healthy lifestyle    Recognize signs and symptoms of STROKE:    F-face looks uneven    A-arms unable to move or move unevenly    S-speech slurred or non-existent    T-time-call 911 as soon as signs and symptoms begin - DO NOT go       back to bed or wait to see if you get better - TIME IS BRAIN. I have had the opportunity to make my options or choices for discharge. I have received and understand these instructions.

## 2017-11-08 NOTE — ROUTINE PROCESS
Bedside shift change report given to TALHA Berman (oncoming nurse) by Ivy Alexis. DAVE Smith (offgoing nurse). Report included the following information SBAR, Kardex, Intake/Output, MAR, Accordion and Recent Results.

## 2017-11-08 NOTE — ROUTINE PROCESS
0730: OB SBAR report received by Brittany Keyes RN. 1315: Discharge instructions reviewed with pt. Prescriptions given. All questions answered. Follow up in 6 weeks with Dr. Vicente Lomas. Pt discharged in wheelchair by volunteers.

## 2017-11-08 NOTE — PROGRESS NOTES
Post-Partum Day Number 2 Progress Note    Nicola Pérez     Assessment: Doing well, post partum day 2    Plan:   1. Discharge home today  2. Follow up in office in 6 weeks with Donovan Funes MD  3. Post partum activity advised, diet as tolerated  4. Discharge Medications: ibuprofen, percocet and medications prior to admission    Information for the patient's :  Jorge Cabrera [[id number]]   Vaginal, Spontaneous Delivery   Patient doing well without significant complaint. Voiding without difficulty, normal lochia. Vitals:  Visit Vitals    /64 (BP 1 Location: Left arm, BP Patient Position: At rest;Sitting)    Pulse 84    Temp 98.1 °F (36.7 °C)    Resp 16    Ht 5' 5\" (1.651 m)    Wt 98.9 kg (218 lb)    SpO2 99%    Breastfeeding Unknown    BMI 36.28 kg/m2     Temp (24hrs), Av.9 °F (36.6 °C), Min:97.6 °F (36.4 °C), Max:98.1 °F (36.7 °C)      Exam:         Patient without distress. Abdomen soft, fundus firm, nontender                 Lower extremities are negative for swelling, cords or tenderness. Labs:     Lab Results   Component Value Date/Time    WBC 9.8 2017 05:16 AM    HGB 11.7 2017 05:16 AM    HCT 34.2 2017 05:16 AM    PLATELET 176 14/15/0645 05:16 AM       No results found for this or any previous visit (from the past 24 hour(s)).

## 2020-07-21 LAB
ANTIBODY SCREEN, EXTERNAL: NEGATIVE
CHLAMYDIA, EXTERNAL: NEGATIVE
HBSAG, EXTERNAL: NEGATIVE
HIV, EXTERNAL: NEGATIVE
N. GONORRHEA, EXTERNAL: NEGATIVE
RPR, EXTERNAL: NON REACTIVE
RUBELLA, EXTERNAL: NORMAL

## 2021-02-09 LAB — GRBS, EXTERNAL: NEGATIVE

## 2021-02-11 ENCOUNTER — TRANSCRIBE ORDER (OUTPATIENT)
Dept: REGISTRATION | Age: 36
End: 2021-02-11

## 2021-02-11 ENCOUNTER — HOSPITAL ENCOUNTER (OUTPATIENT)
Dept: PREADMISSION TESTING | Age: 36
Discharge: HOME | End: 2021-02-11
Payer: COMMERCIAL

## 2021-02-11 DIAGNOSIS — Z01.812 PRE-PROCEDURE LAB EXAM: Primary | ICD-10-CM

## 2021-02-11 DIAGNOSIS — Z01.812 PRE-PROCEDURE LAB EXAM: ICD-10-CM

## 2021-02-11 PROCEDURE — U0003 INFECTIOUS AGENT DETECTION BY NUCLEIC ACID (DNA OR RNA); SEVERE ACUTE RESPIRATORY SYNDROME CORONAVIRUS 2 (SARS-COV-2) (CORONAVIRUS DISEASE [COVID-19]), AMPLIFIED PROBE TECHNIQUE, MAKING USE OF HIGH THROUGHPUT TECHNOLOGIES AS DESCRIBED BY CMS-2020-01-R: HCPCS

## 2021-02-14 LAB — SARS-COV-2, COV2NT: NOT DETECTED

## 2021-02-15 ENCOUNTER — HOSPITAL ENCOUNTER (OUTPATIENT)
Age: 36
Discharge: HOME | End: 2021-02-15
Attending: OBSTETRICS & GYNECOLOGY | Admitting: OBSTETRICS & GYNECOLOGY
Payer: COMMERCIAL

## 2021-02-15 VITALS
TEMPERATURE: 98.2 F | HEIGHT: 68 IN | DIASTOLIC BLOOD PRESSURE: 74 MMHG | RESPIRATION RATE: 20 BRPM | SYSTOLIC BLOOD PRESSURE: 114 MMHG | HEART RATE: 114 BPM | BODY MASS INDEX: 32.28 KG/M2 | WEIGHT: 213 LBS

## 2021-02-15 PROBLEM — O32.2XX0 TRANSVERSE FETAL LIE: Status: ACTIVE | Noted: 2021-02-15

## 2021-02-15 LAB
BASOPHILS # BLD: 0 K/UL (ref 0–0.1)
BASOPHILS NFR BLD: 0 % (ref 0–1)
DIFFERENTIAL METHOD BLD: ABNORMAL
EOSINOPHIL # BLD: 0.2 K/UL (ref 0–0.4)
EOSINOPHIL NFR BLD: 2 % (ref 0–7)
ERYTHROCYTE [DISTWIDTH] IN BLOOD BY AUTOMATED COUNT: 13.9 % (ref 11.5–14.5)
HCT VFR BLD AUTO: 33.4 % (ref 35–47)
HGB BLD-MCNC: 11.5 G/DL (ref 11.5–16)
IMM GRANULOCYTES # BLD AUTO: 0 K/UL
IMM GRANULOCYTES NFR BLD AUTO: 0 %
LYMPHOCYTES # BLD: 1.3 K/UL (ref 0.8–3.5)
LYMPHOCYTES NFR BLD: 17 % (ref 12–49)
MCH RBC QN AUTO: 31.4 PG (ref 26–34)
MCHC RBC AUTO-ENTMCNC: 34.4 G/DL (ref 30–36.5)
MCV RBC AUTO: 91.3 FL (ref 80–99)
MONOCYTES # BLD: 0.5 K/UL (ref 0–1)
MONOCYTES NFR BLD: 6 % (ref 5–13)
NEUTS BAND NFR BLD MANUAL: 4 % (ref 0–6)
NEUTS SEG # BLD: 5.8 K/UL (ref 1.8–8)
NEUTS SEG NFR BLD: 71 % (ref 32–75)
NRBC # BLD: 0 K/UL (ref 0–0.01)
NRBC BLD-RTO: 0 PER 100 WBC
PLATELET # BLD AUTO: 178 K/UL (ref 150–400)
PMV BLD AUTO: 10.7 FL (ref 8.9–12.9)
RBC # BLD AUTO: 3.66 M/UL (ref 3.8–5.2)
RBC MORPH BLD: ABNORMAL
WBC # BLD AUTO: 7.8 K/UL (ref 3.6–11)

## 2021-02-15 PROCEDURE — 59412 ANTEPARTUM MANIPULATION: CPT

## 2021-02-15 PROCEDURE — 86901 BLOOD TYPING SEROLOGIC RH(D): CPT

## 2021-02-15 PROCEDURE — 99218 HC RM OBSERVATION: CPT

## 2021-02-15 PROCEDURE — 85025 COMPLETE CBC W/AUTO DIFF WBC: CPT

## 2021-02-15 PROCEDURE — 74011250636 HC RX REV CODE- 250/636: Performed by: OBSTETRICS & GYNECOLOGY

## 2021-02-15 RX ORDER — TERBUTALINE SULFATE 1 MG/ML
0.25 INJECTION SUBCUTANEOUS ONCE
Status: COMPLETED | OUTPATIENT
Start: 2021-02-15 | End: 2021-02-15

## 2021-02-15 RX ORDER — SODIUM CHLORIDE 0.9 % (FLUSH) 0.9 %
5-40 SYRINGE (ML) INJECTION AS NEEDED
Status: DISCONTINUED | OUTPATIENT
Start: 2021-02-15 | End: 2021-02-15 | Stop reason: HOSPADM

## 2021-02-15 RX ORDER — OXYTOCIN/RINGER'S LACTATE 30/500 ML
87.3 PLASTIC BAG, INJECTION (ML) INTRAVENOUS AS NEEDED
Status: DISCONTINUED | OUTPATIENT
Start: 2021-02-15 | End: 2021-02-15 | Stop reason: HOSPADM

## 2021-02-15 RX ORDER — OXYTOCIN/RINGER'S LACTATE 30/500 ML
10 PLASTIC BAG, INJECTION (ML) INTRAVENOUS AS NEEDED
Status: DISCONTINUED | OUTPATIENT
Start: 2021-02-15 | End: 2021-02-15 | Stop reason: HOSPADM

## 2021-02-15 RX ORDER — SODIUM CHLORIDE 0.9 % (FLUSH) 0.9 %
5-40 SYRINGE (ML) INJECTION EVERY 8 HOURS
Status: DISCONTINUED | OUTPATIENT
Start: 2021-02-15 | End: 2021-02-15 | Stop reason: HOSPADM

## 2021-02-15 RX ORDER — SODIUM CHLORIDE, SODIUM LACTATE, POTASSIUM CHLORIDE, CALCIUM CHLORIDE 600; 310; 30; 20 MG/100ML; MG/100ML; MG/100ML; MG/100ML
1000 INJECTION, SOLUTION INTRAVENOUS CONTINUOUS
Status: DISCONTINUED | OUTPATIENT
Start: 2021-02-15 | End: 2021-02-15 | Stop reason: HOSPADM

## 2021-02-15 RX ADMIN — TERBUTALINE SULFATE 0.25 MG: 1 INJECTION, SOLUTION SUBCUTANEOUS at 11:49

## 2021-02-15 NOTE — DISCHARGE SUMMARY
Antepartum  Discharge Summary     Patient ID:  Wilfrid Hernandez  [id number]  76 y.o.  1985    Admit date: 2/15/2021    Discharge date: 2/15/2021    Admission Diagnoses:    Patient Active Problem List   Diagnosis Code    Normal delivery O80    Transverse fetal lie O32. 2XX0       Discharge Diagnoses: There are no discharge diagnoses documented for the most recent discharge. Patient Active Problem List   Diagnosis Code    Normal delivery O80    Transverse fetal lie O32. 7AX6       Procedures for this admission:     Hospital Course:  Patient admitted at 37wks for ECV, underwent successful ECV. Monitored for 1 hour afterwards and reassuring fetal heart tones. Discharged home. Disposition: Home or self care    Discharged Condition: stable    Patient plans to return for changes in her condition or the condition of the baby or for delivery of the baby. Patient Instructions:   Current Discharge Medication List      CONTINUE these medications which have NOT CHANGED    Details   [id number] fum-folic acid-dss 29 mg iron- 1 mg-25 mg tab Take 1 Tab by mouth daily. Indications: Pregnancy         STOP taking these medications       ibuprofen (MOTRIN) 600 mg tablet Comments:   Reason for Stopping:         oxyCODONE-acetaminophen (PERCOCET) 5-325 mg per tablet Comments:   Reason for Stopping:             Activity: Activity as tolerated  Diet: Regular Diet    Follow-up with No orders of the defined types were placed in this encounter.        Signed:  Khalif Crenshaw MD  2/15/2021  1:08 PM

## 2021-02-15 NOTE — OP NOTES
MRN: [id number]    PATIENT NAME: Lona Santiago    YOB: 1985    DATE OF PROCEDURE: 02/15/21     SURGEON: Avelina Baumgarten, MD    FIRST ASSISTANT: Shahzad Aldridge MD    SERVICE: OBSTETRICS/GYNECOLOGY    PREOPERATIVE DIAGNOSIS: transverse fetal lie    POSTOPERATIVE DIAGNOSIS: vertex fetal presentation s/p ECV    OPERATIVE PROCEDURE: external cephalic version     ANESTHESIA: none    FINDINGS: Anterior placenta, grossly normal fluid on US. Prior to version fetal presentation transverse, back down, head to maternal right. After successful version, vertex fetal presentation noted on bedside US. ANESTHESIOLOGIST: N/A    INDICATIONS: 37 y.o.  at 37w0d with known transverse fetal presentation presented for external cephalic version. PROCEDURE:   Patient was placed in the dorsal supine position with US at bedside and given terbutaline 0.25mg subcutaneously and after her heartrate was noted to rise, the procedure was started. Given the fetus was back down decision was made to attempt a forward roll. The fetal breech was palpated and moved towards the midline and the vertex was palpated and moved in a forward roll motion. The fetus was then noted to convert to breech. A forward roll was then again attempted, and the fetus was noted to have moved to transverse head to maternal right, and a last forward roll attempt was made and the fetal vertex thereafter was the presenting part. Vertex fetal presentation was again confirmed with bedside sono, and the fetal heart rate appeared normal on US, and this was confirmed when the fetal heart rate monitor was replaced. FHR was noted to be reassuring with moderate variability when back on monitor. Patient tolerated the procedure well, she will remain on L&D for monitoring for at least 1 hour after procedure.     Avelina Baumgarten, MD  2/15/2021  12:06 PM

## 2021-02-15 NOTE — H&P
History & Physical    Name: Triny Alva MRN: [id number]  SSN: [id number]    YOB: 1985  Age: 37 y.o. Sex: female      Subjective:     Estimated Date of Delivery: 2021. OB History    Para Term  AB Living   3 1 1   1 1   SAB TAB Ectopic Molar Multiple Live Births   1       0 1      # Outcome Date GA Lbr Mike/2nd Weight Sex Delivery Anes PTL Lv   3 Current            2 Term 17 40w3d 18:02 / 00:44 3.855 kg F Vag-Spont EPIDURAL AN N ATIYA   1 SAB               Obstetric Comments   Blighted Ovum 8wk. Ms. Landon Kapadia is admitted with pregnancy at 37w0d for external cephalic version for fetal malpresentation. Patient had been followed for fetal malpresentation, and US at 34 weeks had shown fetus transverse, head to maternal left, back down. She was counseled in the office regarding risks and benefits of ECV vs scheduled delivery via  section, she and her  have elected to try ECV. She denies painful or regular contractions, vaginal bleeding, or LOF. Reports active FM. Prenatal course has been complicated by  - hx LEEP x2  - transverse fetal lie    Please see prenatal records for details. Past Medical History:   Diagnosis Date    Abnormal Papanicolaou smear of cervix ,    leep procedures, follow up normal     Past Surgical History:   Procedure Laterality Date    HX OTHER SURGICAL  2013    wisdom teeth     Social History     Occupational History    Not on file   Tobacco Use    Smoking status: Never Smoker    Smokeless tobacco: Never Used   Substance and Sexual Activity    Alcohol use: No    Drug use: No    Sexual activity: Never     Partners: Male     No family history on file. No Known Allergies  Prior to Admission medications    Medication Sig Start Date End Date Taking? Authorizing Provider   [id number] fum-folic acid-dss 29 mg iron- 1 mg-25 mg tab Take 1 Tab by mouth daily.  Indications: Pregnancy    Sherman Godwin MD        Review of Systems: A comprehensive review of systems was negative except for that written in the History of Present Illness. Objective:     Vitals: There were no vitals filed for this visit. Physical Exam:  Patient without distress. Heart: Regular rate and rhythm  Lung: normal respiratory effort  Abdomen: soft, nontender, gravid  Cervical Exam: deferred  Lower Extremities: no lower extremity swelling or calf tenderness  Membranes:  Intact  Fetal Heart Rate: Reactive          Bedside sono: fetus transverse back down head maternal right, anterior placenta, grossly normal fluid. Prenatal Labs:   Lab Results   Component Value Date/Time    Rubella, External immune 2017    HBsAg, External non reactive 2017    HIV, External negative 2017    Gonorrhea, External negative 2017    Chlamydia, External negative 2017    ABO,Rh B POSITIVE 2017    GrBStrep, External positive 2017       Impression/Plan:     Active Problems:    Transverse fetal lie (2/15/2021)        37 y.o.  at 37w0d here for external cephalic version for transverse back down fetal position. FHR reactive and reassuring. Maternal/fetal status reassuring. Plan:   - admit for ECV  - terbutaline 0.25mg subQ ordered for prior to procedure start  - US at [facility]  - Discussed risks of procedure including failed ECV, fetal distress necessitating emergent  delivery, abruption, and rupture of membranes.   - Discussed that if successful, fetus could still revert back to breech/transverse and need  delivery in the future  - all questions answered  - proceed pending CBC, T&S results    Marcelle Rodriguez MD  2/15/2021  10:29 AM

## 2021-02-15 NOTE — PROGRESS NOTES
36   Admitted under triage for version to be discharged home. Dr Mariza Bryant at the bedside to ultrasound the pt and go over version with the pt. Ultrasound shows baby is transverse  26  Consents for version signed. Saline lock started in left antecub.  11:11 AM  Pt made aware that once OR is available we will proceed forward with her version. Pt verbalized understanding. 1150  Dr Mariza Bryant at the bedside along with Dr Carmen Dumont to perform version. Terbutaline 0.25given in left upper arm  11:53 AM  Version started. [address] [place] [address] to vertex. EFM reapplied. 200 [facility] over discharge teaching with the pt. Saline lock removed, EFM taken off. Dr Maradiaga Sit at the bedside to re scan to re verify vertex. Baby remains vertex.   1:16 PM  Pt ambulated off the unit all belongings with her

## 2021-02-15 NOTE — DISCHARGE INSTRUCTIONS
Patient Education   Patient Education        Week 37 of Your Pregnancy: Care Instructions  Your Care Instructions     You are near the end of your pregnancy--and you're probably pretty uncomfortable. It may be harder to walk around. Lying down probably isn't comfortable either. You may have trouble getting to sleep or staying asleep. Most women deliver their babies between 40 and 41 weeks. This is a good time to think about packing a bag for the hospital with items you'll need. Then you'll be ready when labor starts. Follow-up care is a key part of your treatment and safety. Be sure to make and go to all appointments, and call your doctor if you are having problems. It's also a good idea to know your test results and keep a list of the medicines you take. How can you care for yourself at home? Learn about breastfeeding  · Breastfeeding is best for your baby and good for you. · Breast milk has antibodies to help your baby fight infections. · Mothers who breastfeed often lose weight faster, because making milk burns calories. · Learning the best ways to hold your baby will make breastfeeding easier. · Let your partner bathe and diaper the baby to keep your partner from feeling left out. Snuggle together when you breastfeed. · You may want to learn how to use a breast pump and store your milk. · If you choose to bottle feed, make the feeding feel like breastfeeding so you can bond with your baby. Always hold your baby and the bottle. Do not prop bottles or let your baby fall asleep with a bottle. Learn about crying  · It is common for babies to cry for 1 to 3 hours a day. Some cry more, some cry less. · Babies don't cry to make you upset or because you are a bad parent. · Crying is how your baby communicates. Your baby may be hungry; have gas; need a diaper change; or feel cold, warm, tired, lonely, or tense. Sometimes babies cry for unknown reasons.   · If you respond to your baby's needs, he or she will learn to trust you. · Try to stay calm when your baby cries. Your baby may get more upset if he or she senses that you are upset. Know how to care for your   · Your baby's umbilical cord stump will drop off on its own, usually between 1 and 2 weeks. To care for your baby's umbilical cord area:  ? Clean the area at the bottom of the cord 2 or 3 times a day. ? Pay special attention to the area where the cord attaches to the skin. ? Keep the diaper folded below the cord. ? Use a damp washcloth or cotton ball to sponge bathe your baby until the stump has come off. · Your baby's first dark stool is called meconium. After the meconium is passed, your baby will develop his or her own bowel pattern. ? Some babies, especially  babies, have several bowel movements a day. Others have one or two a day, or one every 2 to 3 days. ?  babies often have loose, yellow stools. Formula-fed babies have more formed stools. ? If your baby's stools look like little pellets, he or she is constipated. After 2 days of constipation, call your baby's doctor. · If your baby will be circumcised, you can care for him at home. ? Gently rinse his penis with warm water after every diaper change. Do not try to remove the film that forms on the penis. This film will go away on its own. Pat dry. ? Put petroleum ointment, such as Vaseline, on the area of the diaper that will touch your baby's penis. This will keep the diaper from sticking to your baby. ? Ask the doctor about giving your baby acetaminophen (Tylenol) for pain. Where can you learn more? Go to http://www.gray.com/  Enter N257 in the search box to learn more about \"Week 37 of Your Pregnancy: Care Instructions. \"  Current as of: 2020               Content Version: 12.6  © 1193-9857 [employer], Incorporated.    Care instructions adapted under license by [employer] (which disclaims liability or warranty for this information). If you have questions about a medical condition or this instruction, always ask your healthcare professional. [address] any warranty or liability for your use of this information. Counting Your Baby's Kicks: Care Instructions  Your Care Instructions     Counting your baby's kicks is one way your doctor can tell that your baby is healthy. Most women--especially in a first pregnancy--feel their baby move for the first time between 16 and 22 weeks. The movement may feel like flutters rather than kicks. Your baby may move more at certain times of the day. When you are active, you may notice less kicking than when you are resting. At your prenatal visits, your doctor will ask whether the baby is active. In your last trimester, your doctor may ask you to count the number of times you feel your baby move. Follow-up care is a key part of your treatment and safety. Be sure to make and go to all appointments, and call your doctor if you are having problems. It's also a good idea to know your test results and keep a list of the medicines you take. How do you count fetal kicks? · A common method of checking your baby's movement is to count the number of kicks or moves you feel in 1 hour. Ten movements (such as kicks, flutters, or rolls) in 1 hour are normal. Some doctors suggest that you count in the morning until you get to 10 movements. Then you can quit for that day and start again the next day. · Pick your baby's most active time of day to count. This may be any time from morning to evening. · If you do not feel 10 movements in an hour, your baby may be sleeping. Wait for the next hour and count again. When should you call for help?    Call your doctor now or seek immediate medical care if:    · You noticed that your baby has stopped moving or is moving much less than normal.   Watch closely for changes in your health, and be sure to contact your doctor if you have any problems. Where can you learn more? Go to http://www.gray.com/  Enter [id number] in the search box to learn more about \"Counting Your Baby's Kicks: Care Instructions. \"  Current as of: February 11, 2020               Content Version: 12.6  © 2722-0555 [employer]. Care instructions adapted under license by [employer] (which disclaims liability or warranty for this information). If you have questions about a medical condition or this instruction, always ask your healthcare professional. [address] any warranty or liability for your use of this information.

## 2021-02-15 NOTE — PROGRESS NOTES
Progress Note  Patient seen and evaluated. Feels well, denies contractions, vaginal bleeding, or LOF. Reports active fetal movement. FHR reactive and reassuring. Patient discharged home with labor and bleeding precautions. Has appointment in office tomorrow.       Tiara García MD  2/15/2021  1:08 PM

## 2021-02-16 LAB
ABO + RH BLD: NORMAL
BLOOD GROUP ANTIBODIES SERPL: NORMAL
SPECIMEN EXP DATE BLD: NORMAL

## 2021-02-28 ENCOUNTER — TRANSCRIBE ORDER (OUTPATIENT)
Dept: REGISTRATION | Age: 36
End: 2021-02-28

## 2021-02-28 ENCOUNTER — HOSPITAL ENCOUNTER (OUTPATIENT)
Dept: LAB | Age: 36
Discharge: HOME | End: 2021-02-28
Attending: OBSTETRICS & GYNECOLOGY
Payer: COMMERCIAL

## 2021-02-28 DIAGNOSIS — Z01.812 PRE-PROCEDURAL LABORATORY EXAMINATIONS: ICD-10-CM

## 2021-02-28 DIAGNOSIS — Z01.812 PRE-PROCEDURAL LABORATORY EXAMINATIONS: Primary | ICD-10-CM

## 2021-02-28 PROCEDURE — U0003 INFECTIOUS AGENT DETECTION BY NUCLEIC ACID (DNA OR RNA); SEVERE ACUTE RESPIRATORY SYNDROME CORONAVIRUS 2 (SARS-COV-2) (CORONAVIRUS DISEASE [COVID-19]), AMPLIFIED PROBE TECHNIQUE, MAKING USE OF HIGH THROUGHPUT TECHNOLOGIES AS DESCRIBED BY CMS-2020-01-R: HCPCS

## 2021-03-01 LAB — SARS-COV-2, COV2NT: NOT DETECTED

## 2021-03-08 ENCOUNTER — HOSPITAL ENCOUNTER (INPATIENT)
Age: 36
LOS: 3 days | Discharge: HOME | End: 2021-03-11
Attending: OBSTETRICS & GYNECOLOGY | Admitting: OBSTETRICS & GYNECOLOGY
Payer: COMMERCIAL

## 2021-03-08 PROBLEM — O32.2XX0 TRANSVERSE FETAL LIE: Status: RESOLVED | Noted: 2021-02-15 | Resolved: 2021-03-08

## 2021-03-08 PROBLEM — O32.2XX0 TRANSVERSE OR OBLIQUE FETAL PRESENTATION, ANTEPARTUM: Status: ACTIVE | Noted: 2021-03-08

## 2021-03-08 PROBLEM — Z3A.40 40 WEEKS GESTATION OF PREGNANCY: Status: ACTIVE | Noted: 2021-03-08

## 2021-03-08 PROBLEM — O32.0XX0 UNSTABLE LIE OF FETUS, ANTEPARTUM: Status: RESOLVED | Noted: 2021-03-08 | Resolved: 2021-03-08

## 2021-03-08 PROBLEM — O36.63X0 MATERNAL CARE FOR EXCESSIVE FETAL GROWTH IN THIRD TRIMESTER: Status: ACTIVE | Noted: 2021-03-08

## 2021-03-08 PROBLEM — O32.2XX0 TRANSVERSE OR OBLIQUE FETAL PRESENTATION, ANTEPARTUM: Status: RESOLVED | Noted: 2021-03-08 | Resolved: 2021-03-08

## 2021-03-08 PROBLEM — O32.0XX0 UNSTABLE LIE OF FETUS, ANTEPARTUM: Status: ACTIVE | Noted: 2021-03-08

## 2021-03-08 PROBLEM — Z34.90 PREGNANCY: Status: ACTIVE | Noted: 2021-03-08

## 2021-03-08 LAB
ERYTHROCYTE [DISTWIDTH] IN BLOOD BY AUTOMATED COUNT: 13.7 % (ref 11.5–14.5)
HCT VFR BLD AUTO: 34.3 % (ref 35–47)
HGB BLD-MCNC: 11.7 G/DL (ref 11.5–16)
MCH RBC QN AUTO: 31.5 PG (ref 26–34)
MCHC RBC AUTO-ENTMCNC: 34.1 G/DL (ref 30–36.5)
MCV RBC AUTO: 92.2 FL (ref 80–99)
NRBC # BLD: 0 K/UL (ref 0–0.01)
NRBC BLD-RTO: 0 PER 100 WBC
PLATELET # BLD AUTO: 178 K/UL (ref 150–400)
PMV BLD AUTO: 11 FL (ref 8.9–12.9)
RBC # BLD AUTO: 3.72 M/UL (ref 3.8–5.2)
WBC # BLD AUTO: 8.1 K/UL (ref 3.6–11)

## 2021-03-08 PROCEDURE — 74011000258 HC RX REV CODE- 258: Performed by: OBSTETRICS & GYNECOLOGY

## 2021-03-08 PROCEDURE — 36415 COLL VENOUS BLD VENIPUNCTURE: CPT

## 2021-03-08 PROCEDURE — 85027 COMPLETE CBC AUTOMATED: CPT

## 2021-03-08 PROCEDURE — 59200 INSERT CERVICAL DILATOR: CPT | Performed by: OBSTETRICS & GYNECOLOGY

## 2021-03-08 PROCEDURE — 75410000002 HC LABOR FEE PER 1 HR: Performed by: OBSTETRICS & GYNECOLOGY

## 2021-03-08 PROCEDURE — 65270000029 HC RM PRIVATE

## 2021-03-08 PROCEDURE — 86901 BLOOD TYPING SEROLOGIC RH(D): CPT

## 2021-03-08 PROCEDURE — 74011250636 HC RX REV CODE- 250/636: Performed by: OBSTETRICS & GYNECOLOGY

## 2021-03-08 RX ORDER — NALBUPHINE HYDROCHLORIDE 10 MG/ML
10 INJECTION, SOLUTION INTRAMUSCULAR; INTRAVENOUS; SUBCUTANEOUS
Status: DISCONTINUED | OUTPATIENT
Start: 2021-03-08 | End: 2021-03-11 | Stop reason: HOSPADM

## 2021-03-08 RX ORDER — DOCUSATE SODIUM 100 MG/1
100 CAPSULE, LIQUID FILLED ORAL 2 TIMES DAILY
COMMUNITY

## 2021-03-08 RX ORDER — ONDANSETRON 2 MG/ML
4 INJECTION INTRAMUSCULAR; INTRAVENOUS
Status: DISCONTINUED | OUTPATIENT
Start: 2021-03-08 | End: 2021-03-09 | Stop reason: HOSPADM

## 2021-03-08 RX ORDER — NALOXONE HYDROCHLORIDE 0.4 MG/ML
0.4 INJECTION, SOLUTION INTRAMUSCULAR; INTRAVENOUS; SUBCUTANEOUS AS NEEDED
Status: DISCONTINUED | OUTPATIENT
Start: 2021-03-08 | End: 2021-03-09 | Stop reason: HOSPADM

## 2021-03-08 RX ADMIN — SODIUM CHLORIDE, POTASSIUM CHLORIDE, SODIUM LACTATE AND CALCIUM CHLORIDE 500 ML: 600; 310; 30; 20 INJECTION, SOLUTION INTRAVENOUS at 19:24

## 2021-03-08 RX ADMIN — SODIUM CHLORIDE 25 MG: 9 INJECTION, SOLUTION INTRAVENOUS at 19:24

## 2021-03-08 RX ADMIN — NALBUPHINE HYDROCHLORIDE 10 MG: 10 INJECTION, SOLUTION INTRAMUSCULAR; INTRAVENOUS; SUBCUTANEOUS at 19:24

## 2021-03-08 NOTE — PROGRESS NOTES
Labor Progress Note  Patient seen, fetal heart rate and contraction pattern evaluated, patient examined. Visit Vitals  /76 (BP 1 Location: Right arm, BP Patient Position: At rest)   Pulse 88   Temp 98.1 °F (36.7 °C)   Resp 18   Ht 5' 5\" (1.651 m)   Wt 98.9 kg (218 lb)   SpO2 100%   Breastfeeding No   BMI 36.28 kg/m²       Physical Exam:    37 y.o.  in no acute distress. Cervical Exam:   Presentation oblique lie fetal head in right iliac fossa  Dilation 2 cms   Effacement 50 %   Station -3  Membranes: Intact  Uterine Activity:   Frequency: Every 2 minutes   Duration: 60 seconds   Intensity: Moderate  Fetal Heart Rate:    Baseline: 155 bpm   Variability: Moderate   Accelerations: Present (15 x 15 bpm) some prolonged, fetus is very active   Decelerations: None  Category: 1    Procedure: Cook balloon placement  80 mls/ 80 mls    Recent Results (from the past 12 hour(s))   CBC W/O DIFF    Collection Time: 21  5:43 PM   Result Value Ref Range    WBC 8.1 3.6 - 11.0 K/uL    RBC 3.72 (L) 3.80 - 5.20 M/uL    HGB 11.7 11.5 - 16.0 g/dL    HCT 34.3 (L) 35.0 - 47.0 %    MCV 92.2 80.0 - 99.0 FL    MCH 31.5 26.0 - 34.0 PG    MCHC 34.1 30.0 - 36.5 g/dL    RDW 13.7 11.5 - 14.5 %    PLATELET 095 699 - 092 K/uL    MPV 11.0 8.9 - 12.9 FL    NRBC 0.0 0  WBC    ABSOLUTE NRBC 0.00 0.00 - 0.01 K/uL     Assessment/Plan:  Patient Active Problem List   Diagnosis Code    Transverse or oblique fetal presentation, antepartum O32. 2XX0    Maternal care for excessive fetal growth in third trimester O36.63X0    40 weeks gestation of pregnancy Z3A.40    Unstable lie of fetus, antepartum O32. 0XX0     Maternal care for excessive fetal growth in third trimester  EFW 4295 grams    Transverse or oblique fetal presentation, antepartum  Oblique lie fetal head in right iliac fossa  I am not comfortable with amniotomy now.   We discussed options, placement of balloon and attempt induction of labor (acknowleding a chance of developing a transverse lie and ), or proceeding with . We decided on placement of balloon. I can move the vertex to the pelvis, but it won't stay. She is sweetie too much for prostaglandins.   Cook balloon placed 80/80    Octavia Collado MD  3/8/2021

## 2021-03-08 NOTE — PROGRESS NOTES
1701: Pt arrived to L&D at this time; pt in room 206.    1710: Pt placed on efm at this time. [id number] 12: IV placed by this nurse; pt tolerated well. 1754: Dr. Coombs in pt room for assessment and flynn bulb placement. 1815: Dr. Coombs placed Tiwari catheter at this time 80/80; pt tolerated well.    1900:Bedside and Verbal shift change report given to OLESYA Ward (oncoming nurse) by Neena Wooten RN (offgoing nurse). Report included the following information SBAR, Intake/Output, MAR and Recent Results.

## 2021-03-08 NOTE — H&P
History & Physical    Name: Dafne Siddiqui MRN: [id number]  SSN: [id number]    YOB: 1985  Age: 37 y.o. Sex: female        Subjective:     Estimated Date of Delivery: 3/8/21  OB History    Para Term  AB Living   3 1 1   1 1   SAB TAB Ectopic Molar Multiple Live Births   1       0 1      # Outcome Date GA Lbr Mike/2nd Weight Sex Delivery Anes PTL Lv   3 Current            2 Term 17 40w3d 18:02 / 00:44 3.855 kg F Vag-Spont EPIDURAL AN N ATIYA   1 SAB               Obstetric Comments   Blighted Ovum 8wk. Ms. Amalia Cao is admitted with pregnancy at 40w0d for induction of labor. Prenatal course normal except for failed version at 37wks. Pt has been vertex over her last few clinic visits and [address]. Macrosomia was noted on US 3/2: EFW 4295gm (9-7). She was counseled re: LGA and possible birth complications and wants to proceed. She is TT 8-8. GBS is neg   Please see prenatal records for details. Past Medical History:   Diagnosis Date    Abnormal Papanicolaou smear of cervix ,    leep procedures, follow up normal     Past Surgical History:   Procedure Laterality Date    HX OTHER SURGICAL  2013    wisdom teeth     Social History     Occupational History    Not on file   Tobacco Use    Smoking status: Never Smoker    Smokeless tobacco: Never Used   Substance and Sexual Activity    Alcohol use: No    Drug use: No    Sexual activity: Never     Partners: Male     No family history on file. No Known Allergies  Prior to Admission medications    Medication Sig Start Date End Date Taking? Authorizing Provider   [id number] fum-folic acid-dss 29 mg iron- 1 mg-25 mg tab Take 1 Tab by mouth daily. Indications: Pregnancy    Tomas Pineda MD        Review of Systems: A comprehensive review of systems was negative except for that written in the HPI. Objective:     Vitals: There were no vitals filed for this visit. Physical Exam:  Patient without distress.   Abdomen: soft, nontender  Fundus: soft and non tender  Lower Extremities:  - Edema 1+  Membranes:  Intact  Fetal Heart Rate: Reactive    Prenatal Labs:   Lab Results   Component Value Date/Time    ABO/Rh(D) B POSITIVE 02/15/2021 10:20 AM    Rubella, External immune 07/21/2020    GrBStrep, External negative 02/09/2021    HBsAg, External negative 07/21/2020    HIV, External negative 07/21/2020    RPR, External non reactive 07/21/2020    Gonorrhea, External negative 07/21/2020    Chlamydia, External negative 07/21/2020    ABO,Rh B POSITIVE 04/07/2017         Assessment/Plan:     Active Problems:    Pregnancy 40wks, w/ LGA for elective induction w/ balloon catheter as recommended by her OB     Plan: Admit for Reassuring fetal status, elective induction at term. .  Group B Strep was negative.

## 2021-03-08 NOTE — ASSESSMENT & PLAN NOTE
Oblique lie fetal head in right iliac fossa I am not comfortable with amniotomy now. We discussed options, placement of balloon and attempt induction of labor (acknowleding a chance of developing a transverse lie and ), or proceeding with . We decided on placement of balloon. I can move the vertex to the pelvis, but it won't stay. She is sweetie too much for prostaglandins. Thermal Nomad balloon placed 80/80

## 2021-03-09 ENCOUNTER — ANESTHESIA (OUTPATIENT)
Dept: LABOR AND DELIVERY | Age: 36
End: 2021-03-09
Payer: COMMERCIAL

## 2021-03-09 ENCOUNTER — ANESTHESIA EVENT (OUTPATIENT)
Dept: LABOR AND DELIVERY | Age: 36
End: 2021-03-09
Payer: COMMERCIAL

## 2021-03-09 PROBLEM — Z34.90 PREGNANCY: Status: ACTIVE | Noted: 2021-03-09

## 2021-03-09 LAB
ABO + RH BLD: NORMAL
BLOOD GROUP ANTIBODIES SERPL: NORMAL
SPECIMEN EXP DATE BLD: NORMAL

## 2021-03-09 PROCEDURE — 3E0P7VZ INTRODUCTION OF HORMONE INTO FEMALE REPRODUCTIVE, VIA NATURAL OR ARTIFICIAL OPENING: ICD-10-PCS | Performed by: OBSTETRICS & GYNECOLOGY

## 2021-03-09 PROCEDURE — 74011250636 HC RX REV CODE- 250/636: Performed by: OBSTETRICS & GYNECOLOGY

## 2021-03-09 PROCEDURE — 10H07YZ INSERTION OF OTHER DEVICE INTO PRODUCTS OF CONCEPTION, VIA NATURAL OR ARTIFICIAL OPENING: ICD-10-PCS | Performed by: OBSTETRICS & GYNECOLOGY

## 2021-03-09 PROCEDURE — 74011000250 HC RX REV CODE- 250: Performed by: OBSTETRICS & GYNECOLOGY

## 2021-03-09 PROCEDURE — 77030005513 HC CATH URETH FOL11 MDII -B

## 2021-03-09 PROCEDURE — 74011000250 HC RX REV CODE- 250: Performed by: NURSE ANESTHETIST, CERTIFIED REGISTERED

## 2021-03-09 PROCEDURE — 77030018842 HC SOL IRR SOD CL 9% BAXT -A

## 2021-03-09 PROCEDURE — 74011250636 HC RX REV CODE- 250/636: Performed by: NURSE ANESTHETIST, CERTIFIED REGISTERED

## 2021-03-09 PROCEDURE — 2709999900 HC NON-CHARGEABLE SUPPLY

## 2021-03-09 PROCEDURE — 75410000003 HC RECOV DEL/VAG/CSECN EA 0.5 HR: Performed by: OBSTETRICS & GYNECOLOGY

## 2021-03-09 PROCEDURE — 74011000250 HC RX REV CODE- 250: Performed by: ANESTHESIOLOGY

## 2021-03-09 PROCEDURE — 77030014125 HC TY EPDRL BBMI -B: Performed by: ANESTHESIOLOGY

## 2021-03-09 PROCEDURE — 65270000029 HC RM PRIVATE

## 2021-03-09 PROCEDURE — 77030040361 HC SLV COMPR DVT MDII -B

## 2021-03-09 PROCEDURE — 76060000078 HC EPIDURAL ANESTHESIA: Performed by: OBSTETRICS & GYNECOLOGY

## 2021-03-09 PROCEDURE — 4A1HXCZ MONITORING OF PRODUCTS OF CONCEPTION, CARDIAC RATE, EXTERNAL APPROACH: ICD-10-PCS | Performed by: OBSTETRICS & GYNECOLOGY

## 2021-03-09 PROCEDURE — 74011250637 HC RX REV CODE- 250/637: Performed by: OBSTETRICS & GYNECOLOGY

## 2021-03-09 PROCEDURE — 77030008459 HC STPLR SKN COOP -B

## 2021-03-09 PROCEDURE — 75410000002 HC LABOR FEE PER 1 HR: Performed by: OBSTETRICS & GYNECOLOGY

## 2021-03-09 PROCEDURE — 76010000391 HC C SECN FIRST 1 HR: Performed by: OBSTETRICS & GYNECOLOGY

## 2021-03-09 PROCEDURE — 76010000392 HC C SECN EA ADDL 0.5 HR: Performed by: OBSTETRICS & GYNECOLOGY

## 2021-03-09 RX ORDER — OXYTOCIN 10 [USP'U]/ML
INJECTION, SOLUTION INTRAMUSCULAR; INTRAVENOUS AS NEEDED
Status: DISCONTINUED | OUTPATIENT
Start: 2021-03-09 | End: 2021-03-09 | Stop reason: HOSPADM

## 2021-03-09 RX ORDER — EPHEDRINE SULFATE/0.9% NACL/PF 50 MG/5 ML
10 SYRINGE (ML) INTRAVENOUS
Status: DISCONTINUED | OUTPATIENT
Start: 2021-03-09 | End: 2021-03-09 | Stop reason: HOSPADM

## 2021-03-09 RX ORDER — SODIUM CHLORIDE 0.9 % (FLUSH) 0.9 %
5-40 SYRINGE (ML) INJECTION EVERY 8 HOURS
Status: DISCONTINUED | OUTPATIENT
Start: 2021-03-09 | End: 2021-03-11

## 2021-03-09 RX ORDER — IBUPROFEN 800 MG/1
800 TABLET ORAL EVERY 8 HOURS
Status: DISCONTINUED | OUTPATIENT
Start: 2021-03-10 | End: 2021-03-11 | Stop reason: HOSPADM

## 2021-03-09 RX ORDER — SODIUM CHLORIDE, SODIUM LACTATE, POTASSIUM CHLORIDE, CALCIUM CHLORIDE 600; 310; 30; 20 MG/100ML; MG/100ML; MG/100ML; MG/100ML
125 INJECTION, SOLUTION INTRAVENOUS CONTINUOUS
Status: DISCONTINUED | OUTPATIENT
Start: 2021-03-09 | End: 2021-03-11 | Stop reason: HOSPADM

## 2021-03-09 RX ORDER — OXYTOCIN/RINGER'S LACTATE 30/500 ML
1-25 PLASTIC BAG, INJECTION (ML) INTRAVENOUS
Status: DISCONTINUED | OUTPATIENT
Start: 2021-03-09 | End: 2021-03-11 | Stop reason: HOSPADM

## 2021-03-09 RX ORDER — KETOROLAC TROMETHAMINE 30 MG/ML
30 INJECTION, SOLUTION INTRAMUSCULAR; INTRAVENOUS EVERY 6 HOURS
Status: DISCONTINUED | OUTPATIENT
Start: 2021-03-09 | End: 2021-03-09

## 2021-03-09 RX ORDER — NALOXONE HYDROCHLORIDE 0.4 MG/ML
0.4 INJECTION, SOLUTION INTRAMUSCULAR; INTRAVENOUS; SUBCUTANEOUS AS NEEDED
Status: DISCONTINUED | OUTPATIENT
Start: 2021-03-09 | End: 2021-03-11 | Stop reason: HOSPADM

## 2021-03-09 RX ORDER — OXYTOCIN/RINGER'S LACTATE 30/500 ML
87.3 PLASTIC BAG, INJECTION (ML) INTRAVENOUS AS NEEDED
Status: COMPLETED | OUTPATIENT
Start: 2021-03-09 | End: 2021-03-09

## 2021-03-09 RX ORDER — LIDOCAINE HYDROCHLORIDE AND EPINEPHRINE 15; 5 MG/ML; UG/ML
INJECTION, SOLUTION EPIDURAL AS NEEDED
Status: DISCONTINUED | OUTPATIENT
Start: 2021-03-09 | End: 2021-03-09 | Stop reason: HOSPADM

## 2021-03-09 RX ORDER — ACETAMINOPHEN 325 MG/1
650 TABLET ORAL
Status: DISCONTINUED | OUTPATIENT
Start: 2021-03-09 | End: 2021-03-11 | Stop reason: HOSPADM

## 2021-03-09 RX ORDER — OXYTOCIN/RINGER'S LACTATE 30/500 ML
10 PLASTIC BAG, INJECTION (ML) INTRAVENOUS AS NEEDED
Status: DISCONTINUED | OUTPATIENT
Start: 2021-03-09 | End: 2021-03-11 | Stop reason: HOSPADM

## 2021-03-09 RX ORDER — MORPHINE SULFATE 0.5 MG/ML
INJECTION, SOLUTION EPIDURAL; INTRATHECAL; INTRAVENOUS AS NEEDED
Status: DISCONTINUED | OUTPATIENT
Start: 2021-03-09 | End: 2021-03-09 | Stop reason: HOSPADM

## 2021-03-09 RX ORDER — DEXAMETHASONE SODIUM PHOSPHATE 4 MG/ML
INJECTION, SOLUTION INTRA-ARTICULAR; INTRALESIONAL; INTRAMUSCULAR; INTRAVENOUS; SOFT TISSUE AS NEEDED
Status: DISCONTINUED | OUTPATIENT
Start: 2021-03-09 | End: 2021-03-09 | Stop reason: HOSPADM

## 2021-03-09 RX ORDER — OXYTOCIN/RINGER'S LACTATE 30/500 ML
10 PLASTIC BAG, INJECTION (ML) INTRAVENOUS AS NEEDED
Status: DISCONTINUED | OUTPATIENT
Start: 2021-03-09 | End: 2021-03-09 | Stop reason: SDUPTHER

## 2021-03-09 RX ORDER — EPHEDRINE SULFATE/0.9% NACL/PF 50 MG/5 ML
SYRINGE (ML) INTRAVENOUS
Status: DISPENSED
Start: 2021-03-09 | End: 2021-03-09

## 2021-03-09 RX ORDER — ZOLPIDEM TARTRATE 5 MG/1
5 TABLET ORAL
Status: DISCONTINUED | OUTPATIENT
Start: 2021-03-09 | End: 2021-03-11 | Stop reason: HOSPADM

## 2021-03-09 RX ORDER — FENTANYL/BUPIVACAINE/NS/PF 2-1250MCG
1-16 PREFILLED PUMP RESERVOIR EPIDURAL CONTINUOUS
Status: DISCONTINUED | OUTPATIENT
Start: 2021-03-09 | End: 2021-03-09 | Stop reason: HOSPADM

## 2021-03-09 RX ORDER — BISACODYL 5 MG
5 TABLET, DELAYED RELEASE (ENTERIC COATED) ORAL DAILY PRN
Status: DISCONTINUED | OUTPATIENT
Start: 2021-03-09 | End: 2021-03-11 | Stop reason: HOSPADM

## 2021-03-09 RX ORDER — IBUPROFEN 800 MG/1
800 TABLET ORAL EVERY 8 HOURS
Status: DISCONTINUED | OUTPATIENT
Start: 2021-03-09 | End: 2021-03-09

## 2021-03-09 RX ORDER — SODIUM CHLORIDE 9 MG/ML
INJECTION, SOLUTION INTRAVENOUS
Status: DISCONTINUED | OUTPATIENT
Start: 2021-03-09 | End: 2021-03-09 | Stop reason: HOSPADM

## 2021-03-09 RX ORDER — CALCIUM CARBONATE 200(500)MG
400 TABLET,CHEWABLE ORAL
Status: DISCONTINUED | OUTPATIENT
Start: 2021-03-09 | End: 2021-03-11 | Stop reason: HOSPADM

## 2021-03-09 RX ORDER — OXYTOCIN/RINGER'S LACTATE 30/500 ML
87.3 PLASTIC BAG, INJECTION (ML) INTRAVENOUS AS NEEDED
Status: DISCONTINUED | OUTPATIENT
Start: 2021-03-09 | End: 2021-03-09 | Stop reason: SDUPTHER

## 2021-03-09 RX ORDER — OXYCODONE AND ACETAMINOPHEN 5; 325 MG/1; MG/1
1 TABLET ORAL
Status: DISCONTINUED | OUTPATIENT
Start: 2021-03-09 | End: 2021-03-11 | Stop reason: HOSPADM

## 2021-03-09 RX ORDER — ONDANSETRON 2 MG/ML
INJECTION INTRAMUSCULAR; INTRAVENOUS AS NEEDED
Status: DISCONTINUED | OUTPATIENT
Start: 2021-03-09 | End: 2021-03-09 | Stop reason: HOSPADM

## 2021-03-09 RX ORDER — SODIUM CHLORIDE 0.9 % (FLUSH) 0.9 %
5-40 SYRINGE (ML) INJECTION AS NEEDED
Status: DISCONTINUED | OUTPATIENT
Start: 2021-03-09 | End: 2021-03-11 | Stop reason: HOSPADM

## 2021-03-09 RX ORDER — KETOROLAC TROMETHAMINE 30 MG/ML
30 INJECTION, SOLUTION INTRAMUSCULAR; INTRAVENOUS EVERY 6 HOURS
Status: COMPLETED | OUTPATIENT
Start: 2021-03-09 | End: 2021-03-10

## 2021-03-09 RX ORDER — SIMETHICONE 80 MG
80 TABLET,CHEWABLE ORAL AS NEEDED
Status: DISCONTINUED | OUTPATIENT
Start: 2021-03-09 | End: 2021-03-11 | Stop reason: HOSPADM

## 2021-03-09 RX ORDER — ONDANSETRON 2 MG/ML
4 INJECTION INTRAMUSCULAR; INTRAVENOUS
Status: DISCONTINUED | OUTPATIENT
Start: 2021-03-09 | End: 2021-03-11 | Stop reason: HOSPADM

## 2021-03-09 RX ORDER — BUPIVACAINE HYDROCHLORIDE 2.5 MG/ML
INJECTION, SOLUTION EPIDURAL; INFILTRATION; INTRACAUDAL AS NEEDED
Status: DISCONTINUED | OUTPATIENT
Start: 2021-03-09 | End: 2021-03-09 | Stop reason: HOSPADM

## 2021-03-09 RX ORDER — LIDOCAINE HYDROCHLORIDE AND EPINEPHRINE 20; 5 MG/ML; UG/ML
INJECTION, SOLUTION EPIDURAL; INFILTRATION; INTRACAUDAL; PERINEURAL AS NEEDED
Status: DISCONTINUED | OUTPATIENT
Start: 2021-03-09 | End: 2021-03-09 | Stop reason: HOSPADM

## 2021-03-09 RX ADMIN — SODIUM CHLORIDE, POTASSIUM CHLORIDE, SODIUM LACTATE AND CALCIUM CHLORIDE 999 ML/HR: 600; 310; 30; 20 INJECTION, SOLUTION INTRAVENOUS at 14:58

## 2021-03-09 RX ADMIN — SODIUM CHLORIDE, POTASSIUM CHLORIDE, SODIUM LACTATE AND CALCIUM CHLORIDE 125 ML/HR: 600; 310; 30; 20 INJECTION, SOLUTION INTRAVENOUS at 03:24

## 2021-03-09 RX ADMIN — BUPIVACAINE HYDROCHLORIDE 10 ML: 2.5 INJECTION, SOLUTION EPIDURAL; INFILTRATION; INTRACAUDAL; PERINEURAL at 08:44

## 2021-03-09 RX ADMIN — Medication 10 ML/HR: at 09:01

## 2021-03-09 RX ADMIN — OXYTOCIN 87.3 MILLI-UNITS/MIN: 10 INJECTION, SOLUTION INTRAMUSCULAR; INTRAVENOUS at 21:00

## 2021-03-09 RX ADMIN — ONDANSETRON HYDROCHLORIDE 4 MG: 2 SOLUTION INTRAMUSCULAR; INTRAVENOUS at 15:42

## 2021-03-09 RX ADMIN — LIDOCAINE HYDROCHLORIDE,EPINEPHRINE BITARTRATE 5 ML: 20; .005 INJECTION, SOLUTION EPIDURAL; INFILTRATION; INTRACAUDAL; PERINEURAL at 15:42

## 2021-03-09 RX ADMIN — SODIUM CHLORIDE, POTASSIUM CHLORIDE, SODIUM LACTATE AND CALCIUM CHLORIDE 125 ML/HR: 600; 310; 30; 20 INJECTION, SOLUTION INTRAVENOUS at 18:18

## 2021-03-09 RX ADMIN — LIDOCAINE HYDROCHLORIDE AND EPINEPHRINE 3 ML: 15; 5 INJECTION, SOLUTION EPIDURAL at 08:42

## 2021-03-09 RX ADMIN — KETOROLAC TROMETHAMINE 30 MG: 30 INJECTION, SOLUTION INTRAMUSCULAR at 20:15

## 2021-03-09 RX ADMIN — LIDOCAINE HYDROCHLORIDE,EPINEPHRINE BITARTRATE 5 ML: 20; .005 INJECTION, SOLUTION EPIDURAL; INFILTRATION; INTRACAUDAL; PERINEURAL at 15:38

## 2021-03-09 RX ADMIN — OXYTOCIN 2 MILLI-UNITS/MIN: 10 INJECTION, SOLUTION INTRAMUSCULAR; INTRAVENOUS at 03:24

## 2021-03-09 RX ADMIN — DEXAMETHASONE SODIUM PHOSPHATE 4 MG: 4 INJECTION, SOLUTION INTRAMUSCULAR; INTRAVENOUS at 16:11

## 2021-03-09 RX ADMIN — LIDOCAINE HYDROCHLORIDE,EPINEPHRINE BITARTRATE 5 ML: 20; .005 INJECTION, SOLUTION EPIDURAL; INFILTRATION; INTRACAUDAL; PERINEURAL at 15:51

## 2021-03-09 RX ADMIN — ANTACID TABLETS 400 MG: 500 TABLET, CHEWABLE ORAL at 13:30

## 2021-03-09 RX ADMIN — SODIUM CHLORIDE, POTASSIUM CHLORIDE, SODIUM LACTATE AND CALCIUM CHLORIDE 990 ML/HR: 600; 310; 30; 20 INJECTION, SOLUTION INTRAVENOUS at 08:06

## 2021-03-09 RX ADMIN — MORPHINE SULFATE 5 MG: 0.5 INJECTION, SOLUTION EPIDURAL; INTRATHECAL; INTRAVENOUS at 16:11

## 2021-03-09 RX ADMIN — CEFAZOLIN SODIUM 2 G: 1 POWDER, FOR SOLUTION INTRAMUSCULAR; INTRAVENOUS at 15:59

## 2021-03-09 RX ADMIN — SODIUM CHLORIDE: 9 INJECTION, SOLUTION INTRAVENOUS at 16:11

## 2021-03-09 RX ADMIN — SODIUM CHLORIDE, POTASSIUM CHLORIDE, SODIUM LACTATE AND CALCIUM CHLORIDE 125 ML/HR: 600; 310; 30; 20 INJECTION, SOLUTION INTRAVENOUS at 06:10

## 2021-03-09 RX ADMIN — OXYTOCIN 40 UNITS: 10 INJECTION, SOLUTION INTRAMUSCULAR; INTRAVENOUS at 16:11

## 2021-03-09 RX ADMIN — SODIUM CHLORIDE, POTASSIUM CHLORIDE, SODIUM LACTATE AND CALCIUM CHLORIDE 125 ML/HR: 600; 310; 30; 20 INJECTION, SOLUTION INTRAVENOUS at 11:11

## 2021-03-09 RX ADMIN — AZITHROMYCIN DIHYDRATE 500 MG: 500 INJECTION, POWDER, LYOPHILIZED, FOR SOLUTION INTRAVENOUS at 16:39

## 2021-03-09 NOTE — ANESTHESIA POSTPROCEDURE EVALUATION
Procedure(s):   SECTION. epidural    Anesthesia Post Evaluation      Multimodal analgesia: multimodal analgesia not used between 6 hours prior to anesthesia start to PACU discharge  Patient location during evaluation: PACU  Patient participation: complete - patient participated  Level of consciousness: awake  Pain management: adequate  Airway patency: patent  Anesthetic complications: no  Cardiovascular status: acceptable, blood pressure returned to baseline and hemodynamically stable  Respiratory status: acceptable  Hydration status: acceptable  Post anesthesia nausea and vomiting:  controlled  Final Post Anesthesia Temperature Assessment:  Normothermia (36.0-37.5 degrees C)      INITIAL Post-op Vital signs:   Vitals Value Taken Time   /64 21 1807   Temp 37.2 °C (99 °F) 21 1656   Pulse 76 21 1807   Resp 15 21 1656   SpO2 99 % 21 1807   Vitals shown include unvalidated device data.

## 2021-03-09 NOTE — PROGRESS NOTES
Patient just received epidural and pain is starting to improve. Visit Vitals  /73 (BP 1 Location: Right arm, BP Patient Position: At rest)   Pulse 82   Temp 98.1 °F (36.7 °C)   Resp 16   Ht 5' 5\" (1.651 m)   Wt 98.9 kg (218 lb)   SpO2 98%   Breastfeeding No   BMI 36.28 kg/m²     Gen: alert and oriented X3   Resp:nonlabored respirations  Cardiac: normal peripheral perfusion  Abdomen: Gravid   SVE: 5/70/-3     150's/mod/+ accels/ no decels   Contractions ever 4-6 mins     38 yo  at 40.1 admitted for IOL for unstable lie.    S/p flynn and AROM   Continue to titrate pitocin     EFW 4295 g on US on 3/2 , has been counseled on risks of macrosomia     Repeat exam in 2-4 hours     GBS neg/ COVID neg     Gavin Sepulveda M.D.

## 2021-03-09 NOTE — PROGRESS NOTES
3/9/2021  3:40 PM    CM met with ZAFAR to complete initial assessment and begin discharge planning. MOB verified and confirmed demographics. ZAFAR lives with spouse/FOB- Raad Block ([phone]) along with their 1 yr old,  at the address on file. ZAFAR is employed and plans to take 13wks off from work. FOB is also employed and will be taking about 4wks off. ZAFAR reports she has good family support. ZAFAR plans to breast and bottle feed baby and has pump to use at home. ZAFAR plans to follow with [address] [place] [address] Drive for pediatric care. ZAFAR has car seat, bassinet/crib, clothing, bottles and all necessary supplies for baby. ZAFAR has [facility] [employer], and will be adding baby to this policy. CM discussed process to add baby to insurance, MOB verbalized understanding. ZAFAR denied needing WIC/Medicaid services. Care Management Interventions  PCP Verified by CM: Yes([facility])  Mode of Transport at Discharge:  Other (see comment)  Transition of Care Consult (CM Consult): Discharge Planning  Current Support Network: Own Home, Family Lives Nearby, Lives with Spouse  Confirm Follow Up Transport: Family  Discharge Location  Discharge Placement: Home with family assistance  Gallo Chin

## 2021-03-09 NOTE — PROGRESS NOTES
1900- Bedside report received. Pt resting in bed. Monitor adjusted. Pt requesting pain medication. Orders received from Dr Opal Law. 1945- Pt feeling better after medication. Resting in bed with eyes closed. No needs    2130- Block bulb out at this time. 200- Dr Maya Gaspar at bedside. SVE 4/80/-3 ballotable. Unable to SROM at this time. Orders to start Pitocin if contractions slow. 0330- Pitocin started. 6031- Dr Opal Law at bedside. AROM with clear fluid. 6276- Pt requesting epidural.  Bolus started.

## 2021-03-09 NOTE — PROGRESS NOTES
0700 This RN orienting Serafin Avila RN. This RN overseeing charting and patient care. 1320 Discussing inadequate MVUs with Dr. Charla Bobby. Will half pitocin and increase from there, and also give tums.

## 2021-03-09 NOTE — L&D DELIVERY NOTE
Delivery Summary    Patient: Savage Mcfarland MRN: [id number]  SSN: [id number]    YOB: 1985  Age: 37 y.o. Sex: female        Information for the patient's :  Shabbir Montes, Salazar Smalls [[id number]]       Labor Events:    Labor: No    Steroids: None   Cervical Ripening Date/Time: 3/8/2021 6:15 PM   Cervical Ripening Type: Block/EASI   Antibiotics During Labor: No   Rupture Identifier: Sac 1    Rupture Date/Time: 3/9/2021 6:18 AM   Rupture Type: AROM   Amniotic Fluid Volume: Moderate    Amniotic Fluid Description: Clear    Amniotic Fluid Odor: None    Induction: AROM; Block Bulb (balloon); Oxytocin       Induction Date/Time: 3/8/2021 6:15 PM    Indications for Induction: Elective    Augmentation: None   Augmentation Date/Time:      Indications for Augmentation:     Labor complications: Additional complications:        Delivery Events:  Indications For Episiotomy:     Episiotomy: None   Perineal Laceration(s): None   Repaired:     Periurethral Laceration Location:      Repaired:     Labial Laceration Location:     Repaired:     Sulcal Laceration Location:     Repaired:     Vaginal Laceration Location:     Repaired:     Cervical Laceration Location:     Repaired:     Repair Suture: None   Number of Repair Packets:     Estimated Blood Loss (ml):  ml   Quantitaive Blood Loss (ml):             Delivery Date: 3/9/2021    Delivery Time: 4:10 PM   Delivery Type: , Low Transverse     Details    Trial of Labor: Yes   Primary/Repeat: Primary   Priority: Routine   Indications:  Failure to Progress; Fetal Intolerance of Labor       Sex:  Male     Gestational Age: 44w3d  Delivery Clinician:  Ye Enciso  Living Status: Living   Delivery Location: L&D  OR OR #2          APGARS  One minute Five minutes Ten minutes   Skin color:            Heart rate:            Grimace:            Muscle tone:            Breathing:             Totals:              Presentation: Vertex Position:   Occiput Anterior  Resuscitation Method:  Suctioning-bulb; Tactile Stimulation     Meconium Stained: None      Cord Information: 3 Vessels  Complications: None  Cord around:    Delayed cord clamping? Yes  Cord clamped date/time:3/9/2021  4:11 PM  Disposition of Cord Blood: Discard    Blood Gases Sent?: No    Placenta:  Date/Time: 3/9/2021  4:11 PM  Removal: Manual Removal      Appearance: Normal;Intact     Cromwell Measurements:  Birth Weight: 4.745 kg      Birth Length: 55.9 cm      Head Circumference: 41 cm      Chest Circumference: 38 cm     Abdominal Girth: 35 cm    Other Providers:   Medina VALLE HANNAH L;RIANA BATISTA;REGGIE TOMAS;CHRISTIANO TUCKER;CARMELO STARK;ISRAEL LECHUGA, Obstetrician;Primary Nurse;Primary Nurse;Nursery Nurse;Surgeon Assistant;Scrub Tech;Crna             Group B Strep:   Lab Results   Component Value Date/Time    GrBStrep, External negative 2021     Information for the patient's :  Sherri Mcmullen, Male Lashon Sexton [[id number]]   No results found for: ABORH, PCTABR, PCTDIG, BILI, ABORHEXT, ABORH     No results for input(s): PCO2CB, PO2CB, HCO3I, SO2I, IBD, PTEMPI, SPECTI, PHICB, ISITE, IDEV, IALLEN in the last 72 hours.

## 2021-03-09 NOTE — PROGRESS NOTES
Labor Progress Note  Patient seen, fetal heart rate and contraction pattern evaluated, patient examined. Balloon fell out. Visit Vitals  /71   Pulse (!) 109   Temp 98.5 °F (36.9 °C)   Resp 16   Ht 5' 5\" (1.651 m)   Wt 98.9 kg (218 lb)   SpO2 99%   Breastfeeding No   BMI 36.28 kg/m²       Physical Exam:    37 y.o.  in no acute distress.     Cervical Exam:   Presentation Vertex  Dilation 4 cms   Effacement 80 %    Station -3  Membranes: Intact  Uterine Activity:   Frequency: Every 2 minutes   Duration: 60 seconds   Intensity: Moderate  Fetal Heart Rate:    Baseline: 145 bpm   Variability: Moderate   Accelerations: Absent   Decelerations: None  Category: 1    Procedure: too high for amniotomy    Recent Results (from the past 12 hour(s))   CBC W/O DIFF    Collection Time: 21  5:43 PM   Result Value Ref Range    WBC 8.1 3.6 - 11.0 K/uL    RBC 3.72 (L) 3.80 - 5.20 M/uL    HGB 11.7 11.5 - 16.0 g/dL    HCT 34.3 (L) 35.0 - 47.0 %    MCV 92.2 80.0 - 99.0 FL    MCH 31.5 26.0 - 34.0 PG    MCHC 34.1 30.0 - 36.5 g/dL    RDW 13.7 11.5 - 14.5 %    PLATELET 296 577 - 134 K/uL    MPV 11.0 8.9 - 12.9 FL    NRBC 0.0 0  WBC    ABSOLUTE NRBC 0.00 0.00 - 0.01 K/uL     Assessment/Plan:  Patient Active Problem List   Diagnosis Code    Maternal care for excessive fetal growth in third trimester O36.63X0    40 weeks gestation of pregnancy Z3A.40     Maternal care for excessive fetal growth in third trimester  EFW 4295 grams    Fetus is vertex, but too high for safe amniotomy    Lynne Quijano MD  3/8/2021

## 2021-03-09 NOTE — PROGRESS NOTES
0700: Bedside, Verbal and Written shift change report given to MACARIO Thomas RN and Rose Calvo RN (oncoming nurse) by Asia Farr RN (offgoing nurse). Report included the following information SBAR, Kardex, Intake/Output, MAR, Accordion, Recent Results and Med Rec Status. 3343: This RN at pt bedside assessing pt. Patient resting in position of comfort in locked and lowered bed. VSS. This RN assisting with fresh pad change. Patient denies further needs at this time. Call bell within reach. 6598: Ivana Valenzuela CRNA at pt bedside to assess pt for epidural placement. Rose Calvo RN educating pt on proper positioning for epidural placement. Pt verbalizing understanding. This RN standing in front of pt assisting with positioning. 2145: Epidural time out.    0829: Epidural placed by CRNA.     1650-1310: Test dose given by CRNA, RN noting elevated HR. CRNA observing heme in epidural catheter. Epidural line removed, catheter tip in place, will replace with new epidural by CRNA.    0840: Second epidural in place by CRNA.    1832: Test dose given by CRNA. Negative effects noted. 5365: This RN assisting pt back to supine position in bed. 6964-6576: This RN and Rose Calvo RN attempting to adjust EFM. FHTs audible, but not tracing well. Pitocin titration paused. RN requesting Merary HUNTLEY to pt bedside to place Keri Perez.     3089: Merary HUNTLEY at pt bedside to perform SVE and place FSE.     2214: Merary HUNTLEY performing SVE and placing FSE: 5/70/-3. Pt tolerated well. 0884: This RN at pt bedside assessing dermatomes post epidural placement. Dermatome level adequate. Block placed by this RN and Rose Calvo RN at this time. Pt tolerated well. 1018: This RN at pt bedside rounding on pt. Patient resting in position of comfort in locked and lowered bed. Pt eating jello. FOB at pt bedside on couch. VSS. Patient denies further needs at this time. Call bell within reach. 1047: This RN and Rose Calvo RN at pt bedside rounding on pt.  Pt repositoned to lateral right with peanut ball in between knees. Pt tolerated position change well. Call light in reach. Pt denies further needs at this time. 1140: This RN at pt bedside rounding on pt and increasing pitocin titration based on reactive FHR tracing and maternal contraction pattern. Patient resting in position of comfort in locked and lowered bed. VSS. Patient denies further needs at this time. Call bell within reach. 1210: This RN at pt bedside rounding on pt and increasing pitocin titration based on reactive FHR tracing and maternal contraction pattern. Patient resting in position of comfort in locked and lowered bed. VSS. Patient denies further needs at this time. Call bell within reach. 1243: This RN at pt bedside rounding on pt. Patient resting in position of comfort in locked and lowered bed. FOB at pt bedside on couch. VSS. Patient denies further needs at this time. Call bell within reach. 1249: Merary HUNTLEY at pt bedside to perform SVE and place IUPC to better trace maternal contractions. 1250: IUPC placed by Merary HUNTLEY. SVE performed by MD: unchanged. Pt tolerated well. This RN and Lieutenant Tavera RN assisting pt to left lateral position with peanut ball between knees. Pt denies further needs at this time. 1324: This RN at pt bedside administering chewable TUMS, see MAR, per Aaliyah Harper MD. This RN educating pt on POC per MD. Pt verbalizing understanding. Pt at rest in position of comfort talking to FOB. Call light in reach. Pt denies further needs at this time. 1357: This RN at pt bedside assisting pt to lateral right side with peanut ball in between knees. Fresh pads changed. Pt denies further needs at this time. Call light in reach. 1414: 500 mL bolus started at this time by this RN due to FHR tachycardia. Pt educated prior to administering this bolus. Pt verbalizing understanding. Pt temperature WDL. Pt denies chills, pain or any other symptoms. Call light within reach. 1445: This RN updating Merary HUNTLEY on pt status. MD MORGAN continue another fluid bolus d/t FHR tachycardia and to wait to increase pitocin titration at this time. 1458: This RN at pt bedside administering a fluid bolus per Kieran MORGAN. Pt educated on reasoning for bolus (FHR tachycardia). Pt and FOB verbalizing understanding. Pt denies further questions or needs at this time. Call light in reach. 1517: RN requesting MD to pt bedside d/t sustained fetal tachycardia. 1522: Merary HUNTLEY at pt bedside to perform SVE and assess pt status. 1524: Merary HUNTLEY at pt bedside performing SVE: unchanged. C-Section called at this time by Merary HUNTLEY. Pt verbalizing understanding of POC. 1530: This RN and Gabriella John RN trimming pubic area and prepping abdomen with CHG wipes. 1536: Merary HUNTLEY at pt bedside performing fetal US: vtx confirmed. 1544: Pt transferred to OR via stretcher. 1549: FSE and IUPC removed in OR by Gabriella John RN.     9831: Skin incision by Kieran Damon MD.    5610: Uterine incision by Kieran Damon MD.    4175: Delivery of vigorously crying male infant via LTCS by Kieran Damon MD. After delay, cord clamped and cut and  handed to nursery RN. 1611: Placenta delivered. Placenta discarded per MD.     9890: Incision closed with insorb staples, will place steri-strips, mastisol and ABD with medipore tape per MD.     9107: Pt out of OR via stretcher. OR QBL: 1240mL. 1652: Pt back in room 206. SCDs applied. Pt connected to BP cuff and O2 saturation. Pt instructed not to get out of bed and to notify RN if feeling pain and in need of pain medications. Pt verbalizing understanding. This RN and Gabriella John remaining at bedside for first 30 minutes to monitor pt status and vitals. 1735: Pt given ice chips by Gabriella John RN. Denies nausea or further needs at this time. Call light in reach. 1800: This RN at pt bedside rounding on pt. Pt skin to skin with . FOB at pt bedside on couch.  Pt denies further needs at this time. : This RN at pt bedside rounding on pt. Pt skin to skin with . FOB at pt bedside on couch. Pt denies further needs. Call light in reach. 4364-9438: This RN and Yissel Ortega RN at pt bedside rounding on pt and changing fresh pads. 2 hour QBL: 65mL. Pt tolerating fundals well. Pt denies further needs at this time. Call light in reach. Bedside, Verbal and Written shift change report given to NOREEN Cuba (oncoming nurse) by MACARIO Porter  (offgoing nurse). Report included the following information SBAR, Kardex, ED Summary, Intake/Output, MAR, Recent Results and Med Rec Status.

## 2021-03-09 NOTE — PROGRESS NOTES
Operative Note    Name: Jeannette Simmons   Medical Record Number: [id number]   YOB: 1985  Today's Date: 2021      Pre-operative Diagnosis: non-reassuring fetal heart tones, failure to progress    Post-operative Diagnosis: non-reassuring fetal heart tones, failure to progress, macrosomia     Operation: low transverse  section   Procedure(s):   SECTION    Surgeon(s):  Maren Palacios MD    Anesthesia: Spinal    Prophylactic Antibiotics: Ancef, azithromycin ordered- not received prior to incision   DVT Prophylaxis: Sequential Compression Devices         Fetal Description: vega     Birth Information:   Information for the patient's :  Salazar Lu [[id number]]   Delivery of a 4.745 kg male infant on 3/9/2021 at 4:10 PM. Apgars were   and  . Umbilical Cord: 3 Vessels     Umbilical Cord Events: None     Placenta: Manual Removal removal with Normal;Intact appearance. Amniotic Fluid Volume: Moderate     Amniotic Fluid Description:  Clear          Placenta:  manual removal    Estimated Blood Loss (ml):   800m,l QBL pending     Specimens: placenta not sent to path            Complications:  none    Procedure Detail:      After proper patient identification and consent, the patient was taken to the operating room, where epidural anesthesia was administered and found to be adequate. .  The patient was prepped and draped in the normal sterile fashion. The abdomen was entered using the Pfannenstiel technique. The peritoneum was entered sharply well superior to the bladder without any apparent injury. The bladder flap was not created. A low transverse uterine incision was made with the scalpel  and extended with blunt finger dissection. Amniotomy was performed and the fluid was small amount clear. The babys head was then delivered atraumatically. The cord was clamped and cut and the baby was handed off to Nursing staff in attendance.  Placenta was then removed from the uterus. The uterus was curettaged with a moist lap pad and cleared of all clots and debris. The uterine incision was closed with 0 vicryl, double layer  in running locking fashion with good hemostasis assured. Good hemostasis was again reassured. The peritoneum was reapproximated with 2-0 vicryl. The fascia was closed with 0 Vicryl in a running fashion. Good hemostasis was assured. The subcuticular layer was closed with 2-0 plain gut. The skin was closed with an insorb closure. . The patient tolerated the procedure well. Sponge, lap, and needle counts were correct times three and the patient and baby were taken to recovery/postpartum room in stable condition.     Darion Lopez MD  March 9, 2021  4:44 PM

## 2021-03-09 NOTE — ANESTHESIA PREPROCEDURE EVALUATION
Relevant Problems   No relevant active problems       Anesthetic History   No history of anesthetic complications            Review of Systems / Medical History  Patient summary reviewed, nursing notes reviewed and pertinent labs reviewed    Pulmonary  Within defined limits                 Neuro/Psych   Within defined limits           Cardiovascular  Within defined limits                     GI/Hepatic/Renal  Within defined limits              Endo/Other  Within defined limits           Other Findings              Physical Exam    Airway  Mallampati: IV  TM Distance: 4 - 6 cm  Neck ROM: normal range of motion        Cardiovascular    Rhythm: regular  Rate: normal         Dental  No notable dental hx       Pulmonary                 Abdominal         Other Findings            Anesthetic Plan    ASA: 2  Anesthesia type: epidural            Anesthetic plan and risks discussed with: Patient      Risks including headache, backache, failure to work and need for replacement, nerve injury and infection discussed with pt. Pt chooses to proceed. Consent signed. Note entered after procedure.

## 2021-03-09 NOTE — PROGRESS NOTES
Patient resting comfortably. Visit Vitals  /71   Pulse 94   Temp 98.4 °F (36.9 °C)   Resp 16   Ht 5' 5\" (1.651 m)   Wt 98.9 kg (218 lb)   SpO2 100%   Breastfeeding No   BMI 36.28 kg/m²     Gen: alert and oriented X3   Resp:nonlabored respirations  Cardiac: normal peripheral perfusion  Abdomen: Gravid   SVE: 5/70/-3        150's/mod/+ accels/ no decels   Contractions ever 3-4 mins      36 yo  at 40.1 admitted for IOL for unstable lie.    S/p flynn and AROM   Continue to titrate pitocin, currently at 20   IUPC placed to aid in titration of pitocin      EFW 4295 g on US on 3/2 , has been counseled on risks of macrosomia      Repeat exam in 2-4 hours      GBS neg/ COVID neg   Carol Ross M.D.

## 2021-03-09 NOTE — ANESTHESIA PROCEDURE NOTES
Epidural Block    Patient location during procedure: OB  Start time: 3/9/2021 8:21 AM  End time: 3/9/2021 8:42 AM  Reason for block: labor epidural  Staffing  Performed: CRNA   Resident/CRNA: Magy Mancilla CRNA  Preanesthetic Checklist  Completed: patient identified, IV checked, site marked, risks and benefits discussed, surgical consent, monitors and equipment checked, pre-op evaluation and timeout performed  Block Placement  Patient position: sitting  Prep: Betadine  Sterility prep: cap, drape, gloves, hand and mask  Sedation level: no sedation  Patient monitoring: heart rate, frequent blood pressure checks and continuous pulse oximetry  Approach: midline  Location: lumbar  Lumbar location: L4-L5  Epidural  Loss of resistance technique: air  Guidance: landmark technique  Needle  Needle type: Tuohy   Needle gauge: 17 G  Needle length: 9 cm  Needle insertion depth: 5 cm  Catheter size: 18 G  Catheter at skin depth: 11 cm  Catheter securement method: clear occlusive dressing and surgical tape  Test dose: negative  Assessment  Number of attempts: 2  Procedure assessment: patient tolerated procedure well with no complications  Additional Notes  Epidural easily placed on 2nd attempt. Attempt#1 was intravascular at L3-4. Catheter removed with tip intact. Attempt#2 at L4-5 easily placed with COSTA at 5 cm. Flex tip catheter used and easily threaded to 11 cm. Neg heme, neg paresthesia, neg csf.

## 2021-03-09 NOTE — PROGRESS NOTES
Labor Progress Note  Patient seen, fetal heart rate and contraction pattern evaluated, patient examined. Visit Vitals  /68   Pulse (!) 101   Temp 98.5 °F (36.9 °C)   Resp 16   Ht 5' 5\" (1.651 m)   Wt 98.9 kg (218 lb)   SpO2 99%   Breastfeeding No   BMI 36.28 kg/m²       Physical Exam:    37 y.o.  in no acute distress. Cervical Exam:   Presentation Vertex  Dilation 6 cms   Effacement 80 %    Station -2  Membranes: Amniotomy Large amount Clear fluid  Uterine Activity:   Frequency: Every 3 minutes   Duration: 90 seconds   Intensity: Strong  Fetal Heart Rate:    Baseline: 145 bpm   Variability: Moderate   Accelerations: Present (15 x 15 bpm)   Decelerations: None  Category: 1    Procedure: Amniotomy Large amount of Clear    No results found for this or any previous visit (from the past 12 hour(s)).   Assessment/Plan:  Patient Active Problem List   Diagnosis Code    Maternal care for excessive fetal growth in third trimester O36.63X0    40 weeks gestation of pregnancy Z3A.40     Maternal care for excessive fetal growth in third trimester  EFW 4295 grams  Amniotomy  Continue oxytocin    Dayana Rosen MD  3/9/2021

## 2021-03-10 LAB
ALBUMIN SERPL-MCNC: 1.9 G/DL (ref 3.5–5)
ALBUMIN/GLOB SERPL: 0.6 {RATIO} (ref 1.1–2.2)
ALP SERPL-CCNC: 161 U/L (ref 45–117)
ALT SERPL-CCNC: 18 U/L (ref 12–78)
ANION GAP SERPL CALC-SCNC: 7 MMOL/L (ref 5–15)
AST SERPL-CCNC: 20 U/L (ref 15–37)
BASOPHILS # BLD: 0 K/UL (ref 0–0.1)
BASOPHILS # BLD: 0 K/UL (ref 0–0.1)
BASOPHILS NFR BLD: 0 % (ref 0–1)
BASOPHILS NFR BLD: 0 % (ref 0–1)
BILIRUB SERPL-MCNC: 0.4 MG/DL (ref 0.2–1)
BUN SERPL-MCNC: 7 MG/DL (ref 6–20)
BUN/CREAT SERPL: 13 (ref 12–20)
CALCIUM SERPL-MCNC: 8.4 MG/DL (ref 8.5–10.1)
CHLORIDE SERPL-SCNC: 106 MMOL/L (ref 97–108)
CO2 SERPL-SCNC: 24 MMOL/L (ref 21–32)
CREAT SERPL-MCNC: 0.56 MG/DL (ref 0.55–1.02)
DIFFERENTIAL METHOD BLD: ABNORMAL
DIFFERENTIAL METHOD BLD: ABNORMAL
EOSINOPHIL # BLD: 0 K/UL (ref 0–0.4)
EOSINOPHIL # BLD: 0.1 K/UL (ref 0–0.4)
EOSINOPHIL NFR BLD: 0 % (ref 0–7)
EOSINOPHIL NFR BLD: 1 % (ref 0–7)
ERYTHROCYTE [DISTWIDTH] IN BLOOD BY AUTOMATED COUNT: 13.6 % (ref 11.5–14.5)
ERYTHROCYTE [DISTWIDTH] IN BLOOD BY AUTOMATED COUNT: 14 % (ref 11.5–14.5)
GLOBULIN SER CALC-MCNC: 3.2 G/DL (ref 2–4)
GLUCOSE SERPL-MCNC: 92 MG/DL (ref 65–100)
HCT VFR BLD AUTO: 25.2 % (ref 35–47)
HCT VFR BLD AUTO: 27.3 % (ref 35–47)
HGB BLD-MCNC: 8.4 G/DL (ref 11.5–16)
HGB BLD-MCNC: 9.4 G/DL (ref 11.5–16)
IMM GRANULOCYTES # BLD AUTO: 0.2 K/UL (ref 0–0.04)
IMM GRANULOCYTES # BLD AUTO: 0.2 K/UL (ref 0–0.04)
IMM GRANULOCYTES NFR BLD AUTO: 1 % (ref 0–0.5)
IMM GRANULOCYTES NFR BLD AUTO: 1 % (ref 0–0.5)
LYMPHOCYTES # BLD: 1 K/UL (ref 0.8–3.5)
LYMPHOCYTES # BLD: 1.7 K/UL (ref 0.8–3.5)
LYMPHOCYTES NFR BLD: 11 % (ref 12–49)
LYMPHOCYTES NFR BLD: 6 % (ref 12–49)
MCH RBC QN AUTO: 31.8 PG (ref 26–34)
MCH RBC QN AUTO: 32.2 PG (ref 26–34)
MCHC RBC AUTO-ENTMCNC: 33.3 G/DL (ref 30–36.5)
MCHC RBC AUTO-ENTMCNC: 34.4 G/DL (ref 30–36.5)
MCV RBC AUTO: 93.5 FL (ref 80–99)
MCV RBC AUTO: 95.5 FL (ref 80–99)
MONOCYTES # BLD: 1.1 K/UL (ref 0–1)
MONOCYTES # BLD: 1.2 K/UL (ref 0–1)
MONOCYTES NFR BLD: 7 % (ref 5–13)
MONOCYTES NFR BLD: 7 % (ref 5–13)
NEUTS SEG # BLD: 11.8 K/UL (ref 1.8–8)
NEUTS SEG # BLD: 14.8 K/UL (ref 1.8–8)
NEUTS SEG NFR BLD: 80 % (ref 32–75)
NEUTS SEG NFR BLD: 86 % (ref 32–75)
NRBC # BLD: 0 K/UL (ref 0–0.01)
NRBC # BLD: 0 K/UL (ref 0–0.01)
NRBC BLD-RTO: 0 PER 100 WBC
NRBC BLD-RTO: 0 PER 100 WBC
PLATELET # BLD AUTO: 145 K/UL (ref 150–400)
PLATELET # BLD AUTO: 161 K/UL (ref 150–400)
PMV BLD AUTO: 10.4 FL (ref 8.9–12.9)
PMV BLD AUTO: 10.8 FL (ref 8.9–12.9)
POTASSIUM SERPL-SCNC: 3.9 MMOL/L (ref 3.5–5.1)
PROT SERPL-MCNC: 5.1 G/DL (ref 6.4–8.2)
RBC # BLD AUTO: 2.64 M/UL (ref 3.8–5.2)
RBC # BLD AUTO: 2.92 M/UL (ref 3.8–5.2)
SODIUM SERPL-SCNC: 137 MMOL/L (ref 136–145)
WBC # BLD AUTO: 14.8 K/UL (ref 3.6–11)
WBC # BLD AUTO: 17.2 K/UL (ref 3.6–11)

## 2021-03-10 PROCEDURE — 74011250636 HC RX REV CODE- 250/636: Performed by: OBSTETRICS & GYNECOLOGY

## 2021-03-10 PROCEDURE — 65270000029 HC RM PRIVATE

## 2021-03-10 PROCEDURE — 85025 COMPLETE CBC W/AUTO DIFF WBC: CPT

## 2021-03-10 PROCEDURE — 2709999900 HC NON-CHARGEABLE SUPPLY

## 2021-03-10 PROCEDURE — 74011250637 HC RX REV CODE- 250/637: Performed by: OBSTETRICS & GYNECOLOGY

## 2021-03-10 PROCEDURE — 80053 COMPREHEN METABOLIC PANEL: CPT

## 2021-03-10 PROCEDURE — 36415 COLL VENOUS BLD VENIPUNCTURE: CPT

## 2021-03-10 RX ADMIN — SODIUM CHLORIDE, POTASSIUM CHLORIDE, SODIUM LACTATE AND CALCIUM CHLORIDE 125 ML/HR: 600; 310; 30; 20 INJECTION, SOLUTION INTRAVENOUS at 18:22

## 2021-03-10 RX ADMIN — ONDANSETRON 4 MG: 2 INJECTION INTRAMUSCULAR; INTRAVENOUS at 09:02

## 2021-03-10 RX ADMIN — SODIUM CHLORIDE, POTASSIUM CHLORIDE, SODIUM LACTATE AND CALCIUM CHLORIDE 125 ML/HR: 600; 310; 30; 20 INJECTION, SOLUTION INTRAVENOUS at 07:43

## 2021-03-10 RX ADMIN — SODIUM CHLORIDE, POTASSIUM CHLORIDE, SODIUM LACTATE AND CALCIUM CHLORIDE 1000 ML: 600; 310; 30; 20 INJECTION, SOLUTION INTRAVENOUS at 06:37

## 2021-03-10 RX ADMIN — SODIUM CHLORIDE, POTASSIUM CHLORIDE, SODIUM LACTATE AND CALCIUM CHLORIDE 500 ML: 600; 310; 30; 20 INJECTION, SOLUTION INTRAVENOUS at 13:56

## 2021-03-10 RX ADMIN — ONDANSETRON 4 MG: 2 INJECTION INTRAMUSCULAR; INTRAVENOUS at 01:00

## 2021-03-10 RX ADMIN — IBUPROFEN 800 MG: 800 TABLET, FILM COATED ORAL at 21:09

## 2021-03-10 RX ADMIN — KETOROLAC TROMETHAMINE 30 MG: 30 INJECTION, SOLUTION INTRAMUSCULAR at 02:00

## 2021-03-10 RX ADMIN — KETOROLAC TROMETHAMINE 30 MG: 30 INJECTION, SOLUTION INTRAMUSCULAR at 15:07

## 2021-03-10 RX ADMIN — KETOROLAC TROMETHAMINE 30 MG: 30 INJECTION, SOLUTION INTRAMUSCULAR at 09:02

## 2021-03-10 NOTE — PROGRESS NOTES
SBAR OUT Report: Mother    Verbal report given to RAMONE Almaraz RN (full name & credentials) on this patient, who is now being transferred to MIU (unit) for routine progression of care. Report consisted of patient's Situation, Background, Assessment and Recommendations (SBAR).  ID bands were compared with the identification form, and verified with the patient and receiving nurse. Information from the SBAR and the [address] [facility] Report was reviewed with the receiving nurse; opportunity for questions and clarification provided.

## 2021-03-10 NOTE — ROUTINE PROCESS
Bedside and Verbal shift change report given to SAMIRA Oseguera RN (oncoming nurse) by SHY Elizalde RN (offgoing nurse). Report included the following information SBAR, Kardex, Intake/Output and MAR.

## 2021-03-10 NOTE — PROGRESS NOTES
Bedside and Verbal shift change report given to Caren Peng RN (oncoming nurse) by Irma Griffin RN (offgoing nurse). Report included the following information SBAR, Kardex, Intake/Output and MAR.

## 2021-03-10 NOTE — LACTATION NOTE
This note was copied from a baby's chart. Reviewed breastfeeding basics:  Supply and demand,  stomach size, early  Feeding cues, skin to skin, positioning and baby led latch-on,  latched with signs of good, deep latch vs shallow, feeding frequency and duration, and log sheet for tracking infant feedings and output. Breastfeeding Booklet and Warm line information given. Discussed typical  weight loss and the importance of infant weight checks with pediatrician 1-2 post discharge. Hand Expression Education:  Mom taught how to manually hand express her colostrum. Emphasized the importance of providing infant with valuable colostrum as infant rests skin to skin at breast.  Aware to avoid extended periods of non-feeding. Aware to offer 10-20+ drops of colostrum every 2-3 hours until infant is latching and nursing effectively. Taught the rationale behind this low tech but highly effective evidence based practice. Many drops noted. Discussed with mother her plan for feeding. Reviewed the benefits of exclusive breast milk feeding during the hospital stay. Informed her of the risks of using formula to supplement in the first few days of life as well as the benefits of successful breast milk feeding; referred her to the Breastfeeding booklet about this information. She acknowledges understanding of information reviewed and states that it is her plan to breast feed  her infant. Will support her choice and offer additional information as needed. Pt will successfully establish breastfeeding by feeding in response to early feeding cues   or wake every 3h, will obtain deep latch, and will keep log of feedings/output. Taught to BF at hunger cues and or q 2-3 hrs and to offer 10-20 drops of hand expressed colostrum at any non-feeds.       Breast Assessment  Left Breast: Large  Left Nipple: Everted, Intact  Right Breast: Large  Right Nipple: Everted, Intact  Breast- Feeding Assessment  Attends Breast-Feeding Classes: No  Breast-Feeding Experience: Yes(8 months with first)  Breast Trauma/Surgery: No  Type/Quality: Good(per mom,not seen at breast.)

## 2021-03-10 NOTE — PROGRESS NOTES
Post-Operative  Day 1    Pedro Hernández     Assessment: Post-Op day 1, stable s/p PLTCS for NRFS    Plan:   1. Routine post-operative care   2. The risks and benefits of the circumcision  procedure and anesthesia including: bleeding, infection, variability of cosmetic results were discussed at length with the mother. She is aware that future repeat procedures may be necessary. She gives informed consent to proceed as noted and her questions are answered. 3. Post op anemia: Hbg 11.7>9.4, asymptomatic and VSS, will dc with Fe. 4. Low UOP: 30 cc/hour but dark in flynn. Pt received 1 L LR bolus. Will continue maintenance fluids until she is tolerating PO and watch UOP closely. Plan to d/c flynn later today if improved. Information for the patient's :  [facility], Male Lee Curry [[id number]]   , Low Transverse    Patient doing well without significant complaint. Notes nausea, not able to tolerate PO fluids. Flynn still in place. Vitals:  Visit Vitals  /79 (BP 1 Location: Right upper arm)   Pulse 86   Temp 97.8 °F (36.6 °C)   Resp 16   Ht 5' 5\" (1.651 m)   Wt 98.9 kg (218 lb)   SpO2 93%   Breastfeeding Unknown   BMI 36.28 kg/m²     Temp (24hrs), Av.4 °F (36.9 °C), Min:97.8 °F (36.6 °C), Max:99 °F (37.2 °C)      Last 24hr Input/Output:    Intake/Output Summary (Last 24 hours) at 3/10/2021 0827  Last data filed at 3/10/2021 0500  Gross per 24 hour   Intake 850 ml   Output 4455 ml   Net -3605 ml          Exam:        Patient without distress. Lungs clear. Abdomen, bowel sounds present, soft, expected tenderness, fundus firm Wound dressing intact     Perineum normal lochia noted                : flynn in place with dark urine noted               Lower extremities are negative for swelling, cords or tenderness.     Labs:   Lab Results   Component Value Date/Time    WBC 17.2 (H) 03/10/2021 02:34 AM    WBC 8.1 2021 05:43 PM    WBC 7.8 02/15/2021 10:20 AM    WBC 9.8 2017 05:16 AM    HGB 9.4 (L) 03/10/2021 02:34 AM    HGB 11.7 03/08/2021 05:43 PM    HGB 11.5 02/15/2021 10:20 AM    HGB 11.7 11/06/2017 05:16 AM    HCT 27.3 (L) 03/10/2021 02:34 AM    HCT 34.3 (L) 03/08/2021 05:43 PM    HCT 33.4 (L) 02/15/2021 10:20 AM    HCT 34.2 (L) 11/06/2017 05:16 AM    PLATELET 293 02/17/2764 02:34 AM    PLATELET 625 65/13/9081 05:43 PM    PLATELET 350 35/85/0963 10:20 AM    PLATELET 381 07/01/6229 05:16 AM       Recent Results (from the past 24 hour(s))   CBC WITH AUTOMATED DIFF    Collection Time: 03/10/21  2:34 AM   Result Value Ref Range    WBC 17.2 (H) 3.6 - 11.0 K/uL    RBC 2.92 (L) 3.80 - 5.20 M/uL    HGB 9.4 (L) 11.5 - 16.0 g/dL    HCT 27.3 (L) 35.0 - 47.0 %    MCV 93.5 80.0 - 99.0 FL    MCH 32.2 26.0 - 34.0 PG    MCHC 34.4 30.0 - 36.5 g/dL    RDW 13.6 11.5 - 14.5 %    PLATELET 442 800 - 938 K/uL    MPV 10.8 8.9 - 12.9 FL    NRBC 0.0 0  WBC    ABSOLUTE NRBC 0.00 0.00 - 0.01 K/uL    NEUTROPHILS 86 (H) 32 - 75 %    LYMPHOCYTES 6 (L) 12 - 49 %    MONOCYTES 7 5 - 13 %    EOSINOPHILS 0 0 - 7 %    BASOPHILS 0 0 - 1 %    IMMATURE GRANULOCYTES 1 (H) 0.0 - 0.5 %    ABS. NEUTROPHILS 14.8 (H) 1.8 - 8.0 K/UL    ABS. LYMPHOCYTES 1.0 0.8 - 3.5 K/UL    ABS. MONOCYTES 1.2 (H) 0.0 - 1.0 K/UL    ABS. EOSINOPHILS 0.0 0.0 - 0.4 K/UL    ABS. BASOPHILS 0.0 0.0 - 0.1 K/UL    ABS. IMM.  GRANS. 0.2 (H) 0.00 - 0.04 K/UL    DF AUTOMATED

## 2021-03-10 NOTE — PROGRESS NOTES
1350: Called Dr. Bertha Campa to report that patient's UOP since 0500 has been 32 ml/hr, she is reporting mild light headedness and weakness. Most recent BP was 101/64. Received TORB for a 500 cc LR bolus now, and a CBC and CMP now. 1356: Bolus initiated by this RN now, and CBC and CMP drawn by Miya REYES now. 125 cc dark, orange urine emptied from patient's catheter. RN will continue to monitor patient. 1430: WBC 14.8, Hgb 8.4, Hct 25.2. CMP still pending. 4:18 PM Dr. Bertha Campa called to check on patient's status. Patient's labs, urine output, and most recent vital signs discussed with Dr. Bertha Campa. Received order to leave flynn catheter in place until a 606/706 Richy Cohn physician rounds on her in the morning, and keep her LR at 125 ml/hr until that time as well. RN to notify physician for any status changes in patient, will continue monitoring patient's vitals every 4 hours.

## 2021-03-11 VITALS
DIASTOLIC BLOOD PRESSURE: 68 MMHG | BODY MASS INDEX: 36.32 KG/M2 | OXYGEN SATURATION: 93 % | HEIGHT: 65 IN | TEMPERATURE: 98 F | HEART RATE: 81 BPM | RESPIRATION RATE: 16 BRPM | SYSTOLIC BLOOD PRESSURE: 101 MMHG | WEIGHT: 218 LBS

## 2021-03-11 PROCEDURE — 74011250637 HC RX REV CODE- 250/637: Performed by: OBSTETRICS & GYNECOLOGY

## 2021-03-11 RX ORDER — IBUPROFEN 800 MG/1
800 TABLET ORAL
Qty: 30 TAB | Refills: 1 | Status: SHIPPED | OUTPATIENT
Start: 2021-03-11

## 2021-03-11 RX ADMIN — IBUPROFEN 800 MG: 800 TABLET, FILM COATED ORAL at 12:04

## 2021-03-11 RX ADMIN — BISACODYL 5 MG: 5 TABLET, COATED ORAL at 01:09

## 2021-03-11 RX ADMIN — ACETAMINOPHEN 650 MG: 325 TABLET ORAL at 01:09

## 2021-03-11 RX ADMIN — IBUPROFEN 800 MG: 800 TABLET, FILM COATED ORAL at 04:13

## 2021-03-11 NOTE — ROUTINE PROCESS
Discharge instructions given to patient including but not limited to signs and symptoms and who to call; restrictions and limitations; postpartum depression. All questions answered. Discharge supplies given to patient. Signature pad not working in room. Hard signed copy placed in chart. No prescriptions given to patient.

## 2021-03-11 NOTE — DISCHARGE INSTRUCTIONS
DISCHARGE INSTRUCTIONS    Name: Jimmy Ellis  YOB: 1985     Problem List:   Patient Active Problem List   Diagnosis Code    Maternal care for excessive fetal growth in third trimester O36.63X0    40 weeks gestation of pregnancy Z3A.40    Pregnancy Z34.90       Birth Weight: 10 pounds 7.4 ounces Discharge Weight: 10 pounds 2.2 ounces ,     Discharge Bilirubin: 6.5 at 33 Hour Of Life , Low Intermediate risk      Your  at Home: Care Instructions    Your Care Instructions    During your baby's first few weeks, you will spend most of your time feeding, diapering, and comforting your baby. You may feel overwhelmed at times. It is normal to wonder if you know what you are doing, especially if you are first-time parents.  care gets easier with every day. Soon you will know what each cry means and be able to figure out what your baby needs and wants. Follow-up care is a key part of your child's treatment and safety. Be sure to make and go to all appointments, and call your doctor if your child is having problems. It's also a good idea to know your child's test results and keep a list of the medicines your child takes. How can you care for your child at home? Feeding    · Feed your baby on demand. This means that you should breastfeed or bottle-feed your baby whenever he or she seems hungry. Do not set a schedule. · During the first 2 weeks,  babies need to be fed every 1 to 3 hours (10 to 12 times in 24 hours) or whenever the baby is hungry. Formula-fed babies may need fewer feedings, about 6 to 10 every 24 hours. · These early feedings often are short. Sometimes, a  nurses or drinks from a bottle only for a few minutes. Feedings gradually will last longer. · You may have to wake your sleepy baby to feed in the first few days after birth. Sleeping    · Always put your baby to sleep on his or her back, not the stomach.  This lowers the risk of sudden infant death syndrome (SIDS). · Most babies sleep for a total of 18 hours each day. They wake for a short time at least every 2 to 3 hours. · Newborns have some moments of active sleep. The baby may make sounds or seem restless. This happens about every 50 to 60 minutes and usually lasts a few minutes. · At first, your baby may sleep through loud noises. Later, noises may wake your baby. · When your  wakes up, he or she usually will be hungry and will need to be fed. Diaper changing and bowel habits    · Try to check your baby's diaper at least every 2 hours. If it needs to be changed, do it as soon as you can. That will help prevent diaper rash. · Your 's wet and soiled diapers can give you clues about your baby's health. Babies can become dehydrated if they're not getting enough breast milk or formula or if they lose fluid because of diarrhea, vomiting, or a fever. · For the first few days, your baby may have about 3 wet diapers a day. After that, expect 6 or more wet diapers a day throughout the first month of life. It can be hard to tell when a diaper is wet if you use disposable diapers. If you cannot tell, put a piece of tissue in the diaper. It will be wet when your baby urinates. · Keep track of what bowel habits are normal or usual for your child. Umbilical cord care    · Gently clean your baby's umbilical cord stump and the skin around it at least one time a day. You also can clean it during diaper changes. · Gently pat dry the area with a soft cloth. You can help your baby's umbilical cord stump fall off and heal faster by keeping it dry between cleanings. · The stump should fall off within a week or two. After the stump falls off, keep cleaning around the belly button at least one time a day until it has healed. Never shake a baby. Never slap or hit a baby. Caring for a baby can be trying at times.  You may have periods of feeling overwhelmed, especially if your baby is crying. Many babies cry from 1 to 5 hours out of every 24 hours during the first few months of life. Some babies cry more. You can learn ways to help stay in control of your emotions when you feel stressed. Then you can be with your baby in a loving and healthy way. When should you call for help? Call your baby's doctor now or seek immediate medical care if:  · Your baby has a rectal temperature that is less than 97.8°F or is 100.4°F or higher. Call if you cannot take your baby's temperature but he or she seems hot. · Your baby has no wet diapers for 6 hours. · Your baby's skin or whites of the eyes gets a brighter or deeper yellow. · You see pus or red skin on or around the umbilical cord stump. These are signs of infection. Watch closely for changes in your child's health, and be sure to contact your doctor if:  · Your baby is not having regular bowel movements based on his or her age. · Your baby cries in an unusual way or for an unusual length of time. · Your baby is rarely awake and does not wake up for feedings, is very fussy, seems too tired to eat, or is not interested in eating. Learning About Safe Sleep for Babies     Why is safe sleep important? Enjoy your time with your baby, and know that you can do a few things to keep your baby safe. Following safe sleep guidelines can help prevent sudden infant death syndrome (SIDS) and reduce other sleep-related risks. SIDS is the death of a baby younger than 1 year with no known cause. Talk about these safety steps with your  providers, family, friends, and anyone else who spends time with your baby. Explain in detail what you expect them to do. Do not assume that people who care for your baby know these guidelines. What are the tips for safe sleep? Putting your baby to sleep    · Put your baby to sleep on his or her back, not on the side or tummy. This reduces the risk of SIDS.   · Once your baby learns to roll from the back to the belly, you do not need to keep shifting your baby onto his or her back. But keep putting your baby down to sleep on his or her back. · Keep the room at a comfortable temperature so that your baby can sleep in lightweight clothes without a blanket. Usually, the temperature is about right if an adult can wear a long-sleeved T-shirt and pants without feeling cold. Make sure that your baby doesn't get too warm. Your baby is likely too warm if he or she sweats or tosses and turns a lot. · Consider offering your baby a pacifier at nap time and bedtime if your doctor agrees. · The American Academy of Pediatrics recommends that you do not sleep with your baby in the bed with you. · When your baby is awake and someone is watching, allow your baby to spend some time on his or her belly. This helps your baby get strong and may help prevent flat spots on the back of the head. Cribs, cradles, bassinets, and bedding    · For the first 6 months, have your baby sleep in a crib, cradle, or bassinet in the same room where you sleep. · Keep soft items and loose bedding out of the crib. Items such as blankets, stuffed animals, toys, and pillows could block your baby's mouth or trap your baby. Dress your baby in sleepers instead of using blankets. · Make sure that your baby's crib has a firm mattress (with a fitted sheet). Don't use bumper pads or other products that attach to crib slats or sides. They could block your baby's mouth or trap your baby. · Do not place your baby in a car seat, sling, swing, bouncer, or stroller to sleep. The safest place for a baby is in a crib, cradle, or bassinet that meets safety standards. What else is important to know? More about sudden infant death syndrome (SIDS)    SIDS is very rare. In most cases, a parent or other caregiver puts the baby-who seems healthy-down to sleep and returns later to find that the baby has . No one is at fault when a baby dies of SIDS.  A SIDS death cannot be predicted, and in many cases it cannot be prevented. Doctors do not know what causes SIDS. It seems to happen more often in premature and low-birth-weight babies. It also is seen more often in babies whose mothers did not get medical care during the pregnancy and in babies whose mothers smoke. Do not smoke or let anyone else smoke in the house or around your baby. Exposure to smoke increases the risk of SIDS. If you need help quitting, talk to your doctor about stop-smoking programs and medicines. These can increase your chances of quitting for good. Breastfeeding your child may help prevent SIDS. Be wary of products that are billed as helping prevent SIDS. Talk to your doctor before buying any product that claims to reduce SIDS risk.     Additional Information:

## 2021-03-11 NOTE — PROGRESS NOTES
Post-Operative  Day 2    David Blandon       Assessment: Post-Op day 2, doing well, good UOP overnight, desires discharge this PM    Plan:   1. Discharge home today  2. Follow up in office in 6 weeks with Courtney Avila MD  3. Post partum activity/wound care advised, diet as tolerated  4. Discharge Medications: ibuprofen, and medications prior to admission      Information for the patient's :  David Johnson Male Mario Corona [[id number]]   , Low Transverse    Patient doing well without significant complaint. Tolerating diet, passing flatus, voiding and ambulating without difficulty    Vitals:  Visit Vitals  /68   Pulse 81   Temp 98 °F (36.7 °C)   Resp 16   Ht 5' 5\" (1.651 m)   Wt 98.9 kg (218 lb)   SpO2 93%   Breastfeeding Unknown   BMI 36.28 kg/m²     Temp (24hrs), Av.9 °F (36.6 °C), Min:97.4 °F (36.3 °C), Max:98.2 °F (36.8 °C)        Exam:        Patient without distress. Abdomen, bowel sounds present, soft, expected tenderness, fundus firm                Wound incision clean, dry and intact with steri-strips               Lower extremities are negative for swelling, cords or tenderness.     Labs:   Lab Results   Component Value Date/Time    WBC 14.8 (H) 03/10/2021 02:00 PM    WBC 17.2 (H) 03/10/2021 02:34 AM    WBC 8.1 2021 05:43 PM    WBC 7.8 02/15/2021 10:20 AM    WBC 9.8 2017 05:16 AM    HGB 8.4 (L) 03/10/2021 02:00 PM    HGB 9.4 (L) 03/10/2021 02:34 AM    HGB 11.7 2021 05:43 PM    HGB 11.5 02/15/2021 10:20 AM    HGB 11.7 2017 05:16 AM    HCT 25.2 (L) 03/10/2021 02:00 PM    HCT 27.3 (L) 03/10/2021 02:34 AM    HCT 34.3 (L) 2021 05:43 PM    HCT 33.4 (L) 02/15/2021 10:20 AM    HCT 34.2 (L) 2017 05:16 AM    PLATELET 345 (L)  02:00 PM    PLATELET 896  02:34 AM    PLATELET 865 1560/3062 05:43 PM    PLATELET 731 309 10:20 AM    PLATELET 518  05:16 AM       Recent Results (from the past 24 hour(s))   CBC WITH AUTOMATED DIFF    Collection Time: 03/10/21  2:00 PM   Result Value Ref Range    WBC 14.8 (H) 3.6 - 11.0 K/uL    RBC 2.64 (L) 3.80 - 5.20 M/uL    HGB 8.4 (L) 11.5 - 16.0 g/dL    HCT 25.2 (L) 35.0 - 47.0 %    MCV 95.5 80.0 - 99.0 FL    MCH 31.8 26.0 - 34.0 PG    MCHC 33.3 30.0 - 36.5 g/dL    RDW 14.0 11.5 - 14.5 %    PLATELET 740 (L) 657 - 400 K/uL    MPV 10.4 8.9 - 12.9 FL    NRBC 0.0 0  WBC    ABSOLUTE NRBC 0.00 0.00 - 0.01 K/uL    NEUTROPHILS 80 (H) 32 - 75 %    LYMPHOCYTES 11 (L) 12 - 49 %    MONOCYTES 7 5 - 13 %    EOSINOPHILS 1 0 - 7 %    BASOPHILS 0 0 - 1 %    IMMATURE GRANULOCYTES 1 (H) 0.0 - 0.5 %    ABS. NEUTROPHILS 11.8 (H) 1.8 - 8.0 K/UL    ABS. LYMPHOCYTES 1.7 0.8 - 3.5 K/UL    ABS. MONOCYTES 1.1 (H) 0.0 - 1.0 K/UL    ABS. EOSINOPHILS 0.1 0.0 - 0.4 K/UL    ABS. BASOPHILS 0.0 0.0 - 0.1 K/UL    ABS. IMM. GRANS. 0.2 (H) 0.00 - 0.04 K/UL    DF AUTOMATED     METABOLIC PANEL, COMPREHENSIVE    Collection Time: 03/10/21  2:00 PM   Result Value Ref Range    Sodium 137 136 - 145 mmol/L    Potassium 3.9 3.5 - 5.1 mmol/L    Chloride 106 97 - 108 mmol/L    CO2 24 21 - 32 mmol/L    Anion gap 7 5 - 15 mmol/L    Glucose 92 65 - 100 mg/dL    BUN 7 6 - 20 MG/DL    Creatinine 0.56 0.55 - 1.02 MG/DL    BUN/Creatinine ratio 13 12 - 20      GFR est AA >60 >60 ml/min/1.73m2    GFR est non-AA >60 >60 ml/min/1.73m2    Calcium 8.4 (L) 8.5 - 10.1 MG/DL    Bilirubin, total 0.4 0.2 - 1.0 MG/DL    ALT (SGPT) 18 12 - 78 U/L    AST (SGOT) 20 15 - 37 U/L    Alk.  phosphatase 161 (H) 45 - 117 U/L    Protein, total 5.1 (L) 6.4 - 8.2 g/dL    Albumin 1.9 (L) 3.5 - 5.0 g/dL    Globulin 3.2 2.0 - 4.0 g/dL    A-G Ratio 0.6 (L) 1.1 - 2.2

## 2021-03-11 NOTE — PROGRESS NOTES
2215 RN assessed pt and noticed pt was visibly upset, RN asked pt what was wrong and she stated she was over it and tired of the flynn. RN asked pt if she would like to the halls together and pt agreed. Pt walked the halls and was steady on her feet with no pain, RN did put binder on patient for added support. 2230 PT ruiz pad changed, flynn emptied, and back in bed.     2240 RN called hospitalist Dr Henry Crooks inquiring on taking pt flynn out. PT output has been greater than 200 each hour and is clear yellow urine, Dr Moraima García stated it was okay to remove flynn. 2250 Pt flynn removed.

## 2021-03-11 NOTE — PROGRESS NOTES
Patient off unit in stable condition via wheelchair with volunteer for discharge home per Dr. Mishel Nettles.

## 2021-03-11 NOTE — PROGRESS NOTES
Bedside shift change report given to Kaykay Strauss RN (oncoming nurse) by Yolanda Zee RN (offgoing nurse). Report included the following information SBAR, Kardex and MAR.

## 2021-03-11 NOTE — OP NOTES
Name: David Blandon   Medical Record Number: [id number]   YOB: 1985  Today's Date: 2021        Pre-operative Diagnosis: non-reassuring fetal heart tones, failure to progress     Post-operative Diagnosis: non-reassuring fetal heart tones, failure to progress, macrosomia      Operation: low transverse  section   Procedure(s):   SECTION     Surgeon(s):  Courtney Avila MD     Anesthesia: Spinal     Prophylactic Antibiotics: Ancef, azithromycin ordered- not received prior to incision   DVT Prophylaxis: Sequential Compression Devices        Fetal Description: vega      Birth Information:   Information for the patient's :  Melisa Baldwin, Salazar Corona [[id number]]   Delivery of a 4.745 kg male infant on 3/9/2021 at 4:10 PM. Apgars were   and  .     Umbilical Cord: 3 Vessels      Umbilical Cord Events: None      Placenta: Manual Removal removal with Normal;Intact appearance.     Amniotic Fluid Volume: Moderate      Amniotic Fluid Description:  Clear              Placenta:  manual removal     Estimated Blood Loss (ml):   800m,l QBL pending      Specimens: placenta not sent to path            Complications:  none     Procedure Detail:       After proper patient identification and consent, the patient was taken to the operating room, where epidural anesthesia was administered and found to be adequate. .  The patient was prepped and draped in the normal sterile fashion. The abdomen was entered using the Pfannenstiel technique. The peritoneum was entered sharply well superior to the bladder without any apparent injury. The bladder flap was not created. A low transverse uterine incision was made with the scalpel  and extended with blunt finger dissection. Amniotomy was performed and the fluid was small amount clear. The babys head was then delivered atraumatically. The cord was clamped and cut and the baby was handed off to Nursing staff in attendance.  Placenta was then removed from the uterus. The uterus was curettaged with a moist lap pad and cleared of all clots and debris. The uterine incision was closed with 0 vicryl, double layer  in running locking fashion with good hemostasis assured. Good hemostasis was again reassured. The peritoneum was reapproximated with 2-0 vicryl. The fascia was closed with 0 Vicryl in a running fashion. Good hemostasis was assured. The subcuticular layer was closed with 2-0 plain gut. The skin was closed with an insorb closure. . The patient tolerated the procedure well.  Sponge, lap, and needle counts were correct times three and the patient and baby were taken to recovery/postpartum room in stable condition.     Virgie Tamayo MD  March 9, 2021  4:44 PM

## 2021-03-11 NOTE — LACTATION NOTE
This note was copied from a baby's chart. Mother and baby for discharge. Mother states baby has been breastfeeding well and she is also offering formula. Instructed mother to offer baby her breast milk first so that baby can get her antibodies. She has been breastfeeding for some feedings and formula feeding baby for other feedings. Discussed that if she offers formula to hand express 10-20 drops of colostrum for baby and to pump for 20 minutes if baby does not breastfeed to help stimulate her breast milk supply. Mother has a pump for home use. Current  Infant weight loss is -3.0% (WNL). Reviewed breastfeeding basics:  Supply and demand, breastfeed or pump Q 2-3 hr.,   stomach size, early  Feeding cues, skin to skin, positioning and baby led latch-on, assymetrical latch with signs of good, deep latch vs shallow, feeding frequency and duration, and log sheet for tracking infant feedings and output. Breastfeeding Booklet and Warm line information given. Discussed typical  weight loss and the importance of infant weight checks with pediatrician 1-2 post discharge. Engorgement Care Guidelines:  Reviewed how milk is made and normal phases of milk production. Taught care of engorged breasts - frequent breastfeeding encouraged, cool packs and motrin as tolerated. Anticipatory guidance shared. Care for sore/tender nipples discussed:  ways to improve positioning and latch practiced and discussed, hand express colostrum after feedings and let air dry, light application of lanolin, hydrogel pads, seek comfortable laid back feeding position, start feedings on least sore side first.    Discussed eating a healthy diet. Instructed mother to eat a variety of foods in order to get a well balanced diet. She should consume an extra 500 calories per day (more than her non-pregnant requirement.) These extra calories will help provide energy needed for optimal breast milk production.  Mother also encouraged to \"drink to thirst\" and it is recommended that she drink fluids such as water, fruit/vegetable juice. Nutritious snacks should be available so that she can eat throughout the day to help satisfy her hunger and maintain a good milk supply. Discussed pumping/storage and preparation of expressed breast milk for baby. Mother will successfully establish breastfeeding by feeding in response to early feeding cues   or wake every 3h, will obtain deep latch, and will keep log of feedings/output. Taught to BF at hunger cues and or q 2-3 hrs and to offer 10-20 drops of hand expressed colostrum at any non-feeds. Breast Assessment  Left Breast: Large  Left Nipple: Everted, Intact  Right Breast: Large  Right Nipple: Everted, Intact  Breast- Feeding Assessment  Attends Breast-Feeding Classes: No  Breast-Feeding Experience: Yes  Breast Trauma/Surgery: No  Type/Quality: Good  Lactation Consultant Visits  Breast-Feedings: (Mom/baby for D/C. Mother has been breastfeeding and formula feeding. Baby had 25 ml formula at 1300.)      Chart shows numerous feedings, void, stool WNL. Discussed importance of monitoring outputs and feedings on first week of life. Discussed ways to tell if baby is  getting enough breast milk, ie  voids and stools, change in color of stool, and return to birth wt within 2 weeks. Follow up with pediatrician visit for weight check in 1-2 days (per AAP guidelines.)  Encouraged to call Warm Line  [phone]  for any questions/problems that arise.  Mother also given breastfeeding support group dates and times for any future needs

## 2021-03-11 NOTE — DISCHARGE SUMMARY
Obstetrical Discharge Summary     Name: Baron Mayberry MRN: [id number]  SSN: [id number]    YOB: 1985  Age: 37 y.o. Sex: female      Admit Date: 3/8/2021    Discharge Date: 3/11/2021     Admitting Physician: Luciano Go MD     Attending Physician:  Pedro Ramos MD     Admission Diagnoses: Pregnancy [Z34.90]    Discharge Diagnoses:   Information for the patient's :  Agustin Mitchell Male Edgardo Loaiza [[id number]]   Delivery of a 4.745 kg male infant via , Low Transverse on 3/9/2021 at 4:10 PM  by Luciano Go. Apgars were 9  and 9 . Additional Diagnoses:   Hospital Problems  Never Reviewed          Codes Class Noted POA    Pregnancy ICD-10-CM: Z34.90  ICD-9-CM: V22.2  3/9/2021 Unknown        Maternal care for excessive fetal growth in third trimester ICD-10-CM: O36.63X0  ICD-9-CM: 656.63  3/8/2021 Yes        40 weeks gestation of pregnancy ICD-10-CM: Z3A.40  ICD-9-CM: V22.2  3/8/2021 Yes             Lab Results   Component Value Date/Time    Rubella, External immune 2020    GrBStrep, External negative 2021       Hospital Course: Normal hospital course following the delivery. Disposition at Discharge: Home or self care    Discharged Condition: Stable    Patient Instructions:   Current Discharge Medication List      START taking these medications    Details   ibuprofen (MOTRIN) 800 mg tablet Take 1 Tab by mouth every eight (8) hours as needed for Pain. Qty: 30 Tab, Refills: 1         CONTINUE these medications which have NOT CHANGED    Details   docusate sodium (Colace) 100 mg capsule Take 100 mg by mouth two (2) times a day. [id number] fum-folic acid-dss 29 mg iron- 1 mg-25 mg tab Take 1 Tab by mouth daily. Indications: Pregnancy             Reference my discharge instructions. Follow-up Appointments   Procedures    FOLLOW UP VISIT Appointment in: 6 Weeks Post partum follow up with OB provider in 6 weeks.      Post partum follow up with OB provider in 6 weeks.     Standing Status:   Standing     Number of Occurrences:   1     Standing Expiration Date:   3/12/2021     Order Specific Question:   Appointment in     Answer:   6 Weeks        Signed By:  Alena Carballo MD     March 11, 2021                      .

## 2022-01-26 NOTE — DISCHARGE SUMMARY
Obstetrical Discharge Summary     Name: Yessenia Remy MRN: [id number]  SSN: [id number]    YOB: 1985  Age: 29 y.o. Sex: female      Admit Date: 2017    Discharge Date: 2017     Admitting Physician: Yahir Sinha MD     Attending Physician:  Gil Cheng MD     Admission Diagnoses: Abdominal pain during pregnancy in third trimester    Discharge Diagnoses:   Information for the patient's :  Elieser Robledo [[id number]]   Delivery of a 3.855 kg female infant via Vaginal, Spontaneous Delivery on 2017 at 6:46 PM  by . Apgars were 9 and 9. Additional Diagnoses:   Hospital Problems  Never Reviewed          Codes Class Noted POA    Normal delivery ICD-10-CM: [de-identified], Z37.9  ICD-9-CM: 541  2017 Unknown             Lab Results   Component Value Date/Time    Rubella, External immune 2017    GrBStrep, External positive 2017       Hospital Course: Normal hospital course following the delivery. Patient Instructions:   Current Discharge Medication List      START taking these medications    Details   ibuprofen (MOTRIN) 600 mg tablet Take 1 Tab by mouth every six (6) hours as needed for Pain. Qty: 30 Tab, Refills: 1      oxyCODONE-acetaminophen (PERCOCET) 5-325 mg per tablet Take 1 Tab by mouth every six (6) hours as needed. Max Daily Amount: 4 Tabs. Qty: 30 Tab, Refills: 0         CONTINUE these medications which have NOT CHANGED    Details   [id number] fum-folic acid-dss 29 mg iron- 1 mg-25 mg tab Take 1 Tab by mouth daily. Indications: Pregnancy             Disposition at Discharge: Home or self care    Condition at Discharge: Stable    Reference my discharge instructions.     Follow-up Appointments   Procedures    FOLLOW UP VISIT Appointment in: 6 Weeks     Standing Status:   Standing     Number of Occurrences:   1     Order Specific Question:   Appointment in     Answer:   6 Weeks        Signed By:  Orestes Cazares MD     2017
Statement Selected

## (undated) DEVICE — PREP SKN CHLRAPRP APL 26ML STR --

## (undated) DEVICE — 3000CC GUARDIAN II: Brand: GUARDIAN

## (undated) DEVICE — PAD,ABDOMINAL,5"X9",ST,LF,25/BX: Brand: MEDLINE INDUSTRIES, INC.

## (undated) DEVICE — SLEEVE COMPR STD 12 IN FOR 165IN CALF COMFORT VENODYNE SYS

## (undated) DEVICE — MASTISOL ADHESIVE LIQ 2/3ML

## (undated) DEVICE — GARMENT,MEDLINE,DVT,INT,CALF,MED, GEN2: Brand: MEDLINE

## (undated) DEVICE — STERILE POLYISOPRENE POWDER-FREE SURGICAL GLOVES: Brand: PROTEXIS

## (undated) DEVICE — BLADE ASSEMB CLP HAIR FINE --

## (undated) DEVICE — SOL IRR SOD CL 0.9% 1000ML BTL --

## (undated) DEVICE — SUTURE VCRL SZ 2-0 L27IN ABSRB VLT L40MM CT 1/2 CIR J351H

## (undated) DEVICE — LARGE, DISPOSABLE ALEXIS O C-SECTION PROTECTOR - RETRACTOR: Brand: ALEXIS ® O C-SECTION PROTECTOR - RETRACTOR

## (undated) DEVICE — STAPLER SKIN SQ 30 ABSRB STPL DISP INSORB

## (undated) DEVICE — TRAY,URINE METER,100% SILICONE,16FR10ML: Brand: MEDLINE

## (undated) DEVICE — SOLIDIFIER FLUID 3000 CC ABSORB

## (undated) DEVICE — C-SECTION II-LF: Brand: MEDLINE INDUSTRIES, INC.

## (undated) DEVICE — ROCKER SWITCH PENCIL HOLSTER: Brand: VALLEYLAB

## (undated) DEVICE — REM POLYHESIVE ADULT PATIENT RETURN ELECTRODE: Brand: VALLEYLAB

## (undated) DEVICE — TIP CLEANER: Brand: VALLEYLAB

## (undated) DEVICE — STRIP,CLOSURE,WOUND,MEDI-STRIP,1/2X4: Brand: MEDLINE

## (undated) DEVICE — SUTURE VCRL SZ 0 L36IN ABSRB VLT L40MM CT 1/2 CIR J358H

## (undated) DEVICE — SUTURE PLN GUT SZ 2-0 L27IN ABSRB YELLOWISH TAN L70MM XLH 53T

## (undated) DEVICE — SYRINGE IRRIG 60ML SFT PLIABLE BLB EZ TO GRP 1 HND USE W/

## (undated) DEVICE — TOWEL,OR,DSP,ST,BLUE,STD,2/PK,40PK/CS: Brand: MEDLINE

## (undated) DEVICE — ABDOMINAL PAD: Brand: DERMACEA

## (undated) DEVICE — SPONGE: LAP 18X18 W  200/CS: Brand: MEDICAL ACTION INDUSTRIES

## (undated) DEVICE — (D)STRIP SKN CLSR 0.5X4IN WHT --

## (undated) DEVICE — HANDLE LT SNAP ON ULT DURABLE LENS FOR TRUMPF ALC DISPOSABLE